# Patient Record
Sex: FEMALE | NOT HISPANIC OR LATINO
[De-identification: names, ages, dates, MRNs, and addresses within clinical notes are randomized per-mention and may not be internally consistent; named-entity substitution may affect disease eponyms.]

---

## 2017-01-10 ENCOUNTER — CHARTING TRANS (OUTPATIENT)
Dept: OTHER | Age: 67
End: 2017-01-10

## 2017-01-12 ENCOUNTER — CHARTING TRANS (OUTPATIENT)
Dept: OTHER | Age: 67
End: 2017-01-12

## 2017-02-06 ENCOUNTER — CHARTING TRANS (OUTPATIENT)
Dept: OTHER | Age: 67
End: 2017-02-06

## 2017-02-09 ENCOUNTER — CHARTING TRANS (OUTPATIENT)
Dept: OTHER | Age: 67
End: 2017-02-09

## 2017-02-28 ENCOUNTER — LAB SERVICES (OUTPATIENT)
Dept: OTHER | Age: 67
End: 2017-02-28

## 2017-02-28 LAB
ALBUMIN SERPL BCG-MCNC: 3.5 G/DL (ref 3.6–5.1)
ALP SERPL-CCNC: 90 U/L (ref 45–105)
ALT SERPL W/O P-5'-P-CCNC: 26 U/L (ref 15–43)
AST SERPL-CCNC: 23 U/L (ref 14–43)
BASOPHIL %: 0.2 % (ref 0–1.2)
BASOPHIL ABSOLUTE #: 0 10*3/UL (ref 0–0.1)
BILIRUB SERPL-MCNC: 0.7 MG/DL (ref 0–1.3)
BUN SERPL-MCNC: 18 MG/DL (ref 7–20)
CALCIUM SERPL-MCNC: 9.5 MG/DL (ref 8.6–10.6)
CHLORIDE SERPL-SCNC: 106 MMOL/L (ref 96–107)
CREATININE, SERUM: 0.8 MG/DL (ref 0.5–1.4)
DIFFERENTIAL TYPE: NORMAL
EOSINOPHIL %: 2.2 % (ref 0–10)
EOSINOPHIL ABSOLUTE #: 0.1 10*3/UL (ref 0–0.5)
GFR SERPL CREATININE-BSD FRML MDRD: >60 ML/MIN/{1.73M2}
GFR SERPL CREATININE-BSD FRML MDRD: >60 ML/MIN/{1.73M2}
GLUCOSE SERPL-MCNC: 91 MG/DL (ref 70–200)
HCO3 SERPL-SCNC: 26 MMOL/L (ref 22–32)
HEMATOCRIT: 39.8 % (ref 34–45)
HEMOGLOBIN: 13.2 G/DL (ref 11.2–15.7)
LYMPH PERCENT: 43.2 % (ref 20.5–51.1)
LYMPHOCYTE ABSOLUTE #: 2 10*3/UL (ref 1.2–3.4)
MEAN CORPUSCULAR HGB CONCENTRATION: 33.2 % (ref 32–36)
MEAN CORPUSCULAR HGB: 29.3 PG (ref 27–34)
MEAN CORPUSCULAR VOLUME: 88.4 FL (ref 79–95)
MEAN PLATELET VOLUME: 9.6 FL (ref 8.6–12.4)
MONOCYTE ABSOLUTE #: 0.6 10*3/UL (ref 0.2–0.9)
MONOCYTE PERCENT: 12.7 % (ref 4.3–12.9)
NEUTROPHIL ABSOLUTE #: 1.9 10*3/UL (ref 1.4–6.5)
NEUTROPHIL PERCENT: 41.7 % (ref 34–73.5)
PLATELET COUNT: 150 10*3/UL (ref 150–400)
POTASSIUM SERPL-SCNC: 4.6 MMOL/L (ref 3.5–5.3)
PROT SERPL-MCNC: 6.4 G/DL (ref 6.4–8.5)
RED BLOOD CELL COUNT: 4.5 10*6/UL (ref 3.7–5.2)
RED CELL DISTRIBUTION WIDTH: 13.3 % (ref 11.3–14.8)
SODIUM SERPL-SCNC: 141 MMOL/L (ref 136–146)
WHITE BLOOD CELL COUNT: 4.6 10*3/UL (ref 4–10)

## 2017-03-09 ENCOUNTER — CHARTING TRANS (OUTPATIENT)
Dept: OTHER | Age: 67
End: 2017-03-09

## 2017-04-19 ENCOUNTER — CHARTING TRANS (OUTPATIENT)
Dept: OTHER | Age: 67
End: 2017-04-19

## 2017-04-21 ENCOUNTER — CHARTING TRANS (OUTPATIENT)
Dept: OTHER | Age: 67
End: 2017-04-21

## 2017-05-05 ENCOUNTER — IMAGING SERVICES (OUTPATIENT)
Dept: OTHER | Age: 67
End: 2017-05-05

## 2017-05-05 ENCOUNTER — CHARTING TRANS (OUTPATIENT)
Dept: OTHER | Age: 67
End: 2017-05-05

## 2017-05-08 ENCOUNTER — CHARTING TRANS (OUTPATIENT)
Dept: OTHER | Age: 67
End: 2017-05-08

## 2017-05-09 ENCOUNTER — CHARTING TRANS (OUTPATIENT)
Dept: OTHER | Age: 67
End: 2017-05-09

## 2017-05-18 ENCOUNTER — CHARTING TRANS (OUTPATIENT)
Dept: OTHER | Age: 67
End: 2017-05-18

## 2017-05-23 ENCOUNTER — CHARTING TRANS (OUTPATIENT)
Dept: OTHER | Age: 67
End: 2017-05-23

## 2017-05-24 ENCOUNTER — CHARTING TRANS (OUTPATIENT)
Dept: OTHER | Age: 67
End: 2017-05-24

## 2017-05-24 ENCOUNTER — MYAURORA ACCOUNT LINK (OUTPATIENT)
Dept: OTHER | Age: 67
End: 2017-05-24

## 2017-05-26 ENCOUNTER — CHARTING TRANS (OUTPATIENT)
Dept: OTHER | Age: 67
End: 2017-05-26

## 2017-06-06 ENCOUNTER — CHARTING TRANS (OUTPATIENT)
Dept: OTHER | Age: 67
End: 2017-06-06

## 2017-06-08 ENCOUNTER — CHARTING TRANS (OUTPATIENT)
Dept: OTHER | Age: 67
End: 2017-06-08

## 2017-06-12 ENCOUNTER — CHARTING TRANS (OUTPATIENT)
Dept: OTHER | Age: 67
End: 2017-06-12

## 2017-06-15 ENCOUNTER — MYAURORA ACCOUNT LINK (OUTPATIENT)
Dept: OTHER | Age: 67
End: 2017-06-15

## 2017-06-15 ENCOUNTER — CHARTING TRANS (OUTPATIENT)
Dept: OTHER | Age: 67
End: 2017-06-15

## 2017-06-30 ENCOUNTER — CHARTING TRANS (OUTPATIENT)
Dept: OTHER | Age: 67
End: 2017-06-30

## 2017-07-25 ENCOUNTER — CHARTING TRANS (OUTPATIENT)
Dept: OTHER | Age: 67
End: 2017-07-25

## 2017-08-11 ENCOUNTER — CHARTING TRANS (OUTPATIENT)
Dept: OTHER | Age: 67
End: 2017-08-11

## 2017-08-11 ENCOUNTER — MYAURORA ACCOUNT LINK (OUTPATIENT)
Dept: OTHER | Age: 67
End: 2017-08-11

## 2017-09-20 ENCOUNTER — CHARTING TRANS (OUTPATIENT)
Dept: OTHER | Age: 67
End: 2017-09-20

## 2017-09-26 ENCOUNTER — CHARTING TRANS (OUTPATIENT)
Dept: OTHER | Age: 67
End: 2017-09-26

## 2017-11-02 ENCOUNTER — CHARTING TRANS (OUTPATIENT)
Dept: OTHER | Age: 67
End: 2017-11-02

## 2018-02-10 ENCOUNTER — CHARTING TRANS (OUTPATIENT)
Dept: OTHER | Age: 68
End: 2018-02-10

## 2018-05-24 ENCOUNTER — CHARTING TRANS (OUTPATIENT)
Dept: OTHER | Age: 68
End: 2018-05-24

## 2018-06-24 ENCOUNTER — CHARTING TRANS (OUTPATIENT)
Dept: OTHER | Age: 68
End: 2018-06-24

## 2018-06-26 ENCOUNTER — MYAURORA ACCOUNT LINK (OUTPATIENT)
Dept: OTHER | Age: 68
End: 2018-06-26

## 2018-11-29 VITALS — HEART RATE: 64 BPM | DIASTOLIC BLOOD PRESSURE: 66 MMHG | SYSTOLIC BLOOD PRESSURE: 118 MMHG | WEIGHT: 135 LBS

## 2019-03-06 VITALS
OXYGEN SATURATION: 97 % | DIASTOLIC BLOOD PRESSURE: 62 MMHG | HEART RATE: 55 BPM | BODY MASS INDEX: 24.55 KG/M2 | SYSTOLIC BLOOD PRESSURE: 120 MMHG | WEIGHT: 143 LBS

## 2020-02-07 ENCOUNTER — OFFICE VISIT (OUTPATIENT)
Dept: FAMILY MEDICINE CLINIC | Facility: CLINIC | Age: 70
End: 2020-02-07
Payer: MEDICARE

## 2020-02-07 VITALS
RESPIRATION RATE: 18 BRPM | TEMPERATURE: 98 F | SYSTOLIC BLOOD PRESSURE: 120 MMHG | WEIGHT: 140.13 LBS | HEART RATE: 80 BPM | DIASTOLIC BLOOD PRESSURE: 66 MMHG | HEIGHT: 61 IN | BODY MASS INDEX: 26.46 KG/M2 | OXYGEN SATURATION: 97 %

## 2020-02-07 DIAGNOSIS — M81.0 AGE-RELATED OSTEOPOROSIS WITHOUT CURRENT PATHOLOGICAL FRACTURE: Primary | ICD-10-CM

## 2020-02-07 DIAGNOSIS — G43.709 CHRONIC MIGRAINE WITHOUT AURA WITHOUT STATUS MIGRAINOSUS, NOT INTRACTABLE: ICD-10-CM

## 2020-02-07 DIAGNOSIS — F41.9 ANXIETY: ICD-10-CM

## 2020-02-07 DIAGNOSIS — R79.89 ELEVATED SERUM CREATININE: ICD-10-CM

## 2020-02-07 DIAGNOSIS — K21.9 GASTROESOPHAGEAL REFLUX DISEASE WITHOUT ESOPHAGITIS: ICD-10-CM

## 2020-02-07 PROCEDURE — 99204 OFFICE O/P NEW MOD 45 MIN: CPT | Performed by: FAMILY MEDICINE

## 2020-02-07 RX ORDER — PYRIDOXINE HCL (VITAMIN B6) 100 MG
TABLET ORAL DAILY
COMMUNITY

## 2020-02-07 RX ORDER — RIZATRIPTAN BENZOATE 10 MG/1
10 TABLET, ORALLY DISINTEGRATING ORAL AS NEEDED
COMMUNITY
End: 2020-04-17

## 2020-02-07 RX ORDER — FLUTICASONE PROPIONATE 50 MCG
2 SPRAY, SUSPENSION (ML) NASAL DAILY
Refills: 0 | COMMUNITY
Start: 2020-02-07 | End: 2020-09-22

## 2020-02-07 RX ORDER — TOPIRAMATE 25 MG/1
25 TABLET ORAL 2 TIMES DAILY
COMMUNITY
Start: 2019-11-19 | End: 2021-03-31

## 2020-02-07 RX ORDER — PANTOPRAZOLE SODIUM 40 MG/1
TABLET, DELAYED RELEASE ORAL
COMMUNITY
Start: 2020-01-16 | End: 2020-02-07 | Stop reason: ALTCHOICE

## 2020-02-07 RX ORDER — MULTIVIT-MIN/IRON/FOLIC ACID/K 18-600-40
2500 CAPSULE ORAL DAILY
COMMUNITY

## 2020-02-07 RX ORDER — BUSPIRONE HYDROCHLORIDE 30 MG/1
30 TABLET ORAL 2 TIMES DAILY
COMMUNITY
End: 2020-10-04

## 2020-02-07 RX ORDER — PYRIDOXINE HCL (VITAMIN B6) 100 MG
22 TABLET ORAL DAILY
COMMUNITY
Start: 2020-02-07 | End: 2021-08-18 | Stop reason: ALTCHOICE

## 2020-02-07 RX ORDER — ALENDRONATE SODIUM 70 MG/1
70 TABLET ORAL WEEKLY
Qty: 12 TABLET | Refills: 3 | Status: SHIPPED | OUTPATIENT
Start: 2020-02-07 | End: 2021-01-09

## 2020-02-07 RX ORDER — PROPRANOLOL HYDROCHLORIDE 10 MG/1
20 TABLET ORAL 2 TIMES DAILY
COMMUNITY
End: 2021-03-31

## 2020-02-07 RX ORDER — MULTIVITAMIN
TABLET ORAL
COMMUNITY

## 2020-02-07 NOTE — PROGRESS NOTES
Derek Shepard is a 68year old female. HPI:   Patient here to establish care (referred by her dtr Geraldo Mata) and discuss bone density. At her last doctor she had a Cpx and a bone density scan and found \"severe osteoporosis. \"  This was her first bone de Oral Tab Take 25 mg by mouth 2 (two) times daily. • Fluticasone Propionate 50 MCG/ACT Nasal Suspension 2 sprays by Each Nare route daily. 0   • Ferrous Fumarate (IRON) 18 MG Oral Tab CR Take 22 mg by mouth daily.      • alendronate 70 MG Oral Tab Take clubbing or edema    ASSESSMENT AND PLAN:   Diagnoses and all orders for this visit:  This visit is primarily for counseling, spent 47 min with pt >50% of time in counseling and review of below:     Age-related osteoporosis without current pathological fra

## 2020-02-11 LAB
BUN/CREATININE RATIO: 25 (CALC) (ref 6–22)
BUN: 27 MG/DL (ref 7–25)
CALCIUM: 9.7 MG/DL (ref 8.6–10.4)
CALCIUM: 9.7 MG/DL (ref 8.6–10.4)
CARBON DIOXIDE: 27 MMOL/L (ref 20–32)
CHLORIDE: 105 MMOL/L (ref 98–110)
CREATININE: 1.06 MG/DL (ref 0.6–0.93)
EGFR IF AFRICN AM: 61 ML/MIN/1.73M2
EGFR IF NONAFRICN AM: 53 ML/MIN/1.73M2
GLUCOSE: 84 MG/DL (ref 65–99)
PARATHYROID HORMONE,$INTACT: 28 PG/ML (ref 14–64)
POTASSIUM: 4.4 MMOL/L (ref 3.5–5.3)
SODIUM: 140 MMOL/L (ref 135–146)

## 2020-02-12 ENCOUNTER — TELEPHONE (OUTPATIENT)
Dept: FAMILY MEDICINE CLINIC | Facility: CLINIC | Age: 70
End: 2020-02-12

## 2020-02-12 NOTE — TELEPHONE ENCOUNTER
The medication list the pt brought to the office visit indicates that the pt had a Prevnar in 2015 but the parenthesis after says shingles    Need to know is she had a prevnar or a shingles vaccine        Left message for the pt to call back so that I can

## 2020-02-14 NOTE — TELEPHONE ENCOUNTER
Spoke with the pt and asked her about the prevnar/shingles on the printout she gave us    She states that she had both the prevnar and shingles vaccine  On 5/8/15

## 2020-04-17 ENCOUNTER — PATIENT MESSAGE (OUTPATIENT)
Dept: FAMILY MEDICINE CLINIC | Facility: CLINIC | Age: 70
End: 2020-04-17

## 2020-04-17 RX ORDER — RIZATRIPTAN BENZOATE 10 MG/1
TABLET ORAL AS NEEDED
Qty: 12 TABLET | Refills: 3 | Status: SHIPPED | OUTPATIENT
Start: 2020-04-17 | End: 2020-09-02

## 2020-04-17 RX ORDER — RIZATRIPTAN BENZOATE 5 MG/1
5 TABLET, ORALLY DISINTEGRATING ORAL EVERY 2 HOUR PRN
Qty: 30 TABLET | Refills: 1 | Status: SHIPPED | OUTPATIENT
Start: 2020-04-17 | End: 2020-04-17

## 2020-04-17 NOTE — TELEPHONE ENCOUNTER
From: Felipe Stern  To: Isabelle Quintanilla MD  Sent: 4/17/2020 8:48 AM CDT  Subject: Prescription Question    Good morning! Could you please put in a refill for my rizatriptan? There is no option to select the refill in the medications section.  Could you set i

## 2020-04-17 NOTE — TELEPHONE ENCOUNTER
From: Andrew Rousseau  To:  Mendez Moya MD  Sent: 2020 9:55 AM CDT  Subject: Prescription Question    So sorry to bother you again but [facility] just called that my prescription for rizatriptan was ready and the price got me to check on it because it was

## 2020-09-02 RX ORDER — RIZATRIPTAN BENZOATE 10 MG/1
TABLET ORAL
Qty: 12 TABLET | Refills: 3 | Status: SHIPPED | OUTPATIENT
Start: 2020-09-02 | End: 2021-01-09

## 2020-09-22 NOTE — TELEPHONE ENCOUNTER
Allergy Medication Protocol [id number]/22/2020 11:02 AM   Appointment in the past 12 or next 3 months     Last refill listed as historical-protocol passed-but never dispensed by [facility]  Last OV 2/7/20   No future appointments.

## 2020-09-23 RX ORDER — FLUTICASONE PROPIONATE 50 MCG
2 SPRAY, SUSPENSION (ML) NASAL DAILY
Qty: 3 INHALER | Refills: 3 | Status: SHIPPED | OUTPATIENT
Start: 2020-09-23 | End: 2021-12-20

## 2020-10-02 ENCOUNTER — PATIENT MESSAGE (OUTPATIENT)
Dept: FAMILY MEDICINE CLINIC | Facility: CLINIC | Age: 70
End: 2020-10-02

## 2020-10-02 NOTE — TELEPHONE ENCOUNTER
From: Peg Chandler  To:  Vandana Kwan MD  Sent: 10/2/2020 10:01 AM CDT  Subject: Prescription Question    Good Morning,  I was trying to refill a prescription for my Buspirone 30 mg 1 tablet a day and there is no option on the my chart to send to you, so

## 2020-10-04 RX ORDER — BUSPIRONE HYDROCHLORIDE 30 MG/1
30 TABLET ORAL DAILY
Qty: 90 TABLET | Refills: 0 | Status: SHIPPED | OUTPATIENT
Start: 2020-10-04 | End: 2021-01-09

## 2021-01-09 NOTE — TELEPHONE ENCOUNTER
Osteoporosis Medication Protocol [id number]/09/2021 08:02 AM   DEXA scan within past 2 years Protocol Details    CMP within the past 12 months     Calcium level between 8.3 and 10.3     GFR level greater than 35     Appointment within past 12 or next 3 month

## 2021-01-10 RX ORDER — ALENDRONATE SODIUM 70 MG/1
70 TABLET ORAL WEEKLY
Qty: 12 TABLET | Refills: 3 | Status: SHIPPED | OUTPATIENT
Start: 2021-01-10 | End: 2021-12-20

## 2021-01-10 RX ORDER — RIZATRIPTAN BENZOATE 10 MG/1
TABLET ORAL AS NEEDED
Qty: 12 TABLET | Refills: 3 | Status: SHIPPED | OUTPATIENT
Start: 2021-01-10 | End: 2021-07-12

## 2021-01-10 RX ORDER — BUSPIRONE HYDROCHLORIDE 30 MG/1
30 TABLET ORAL DAILY
Qty: 90 TABLET | Refills: 0 | Status: SHIPPED | OUTPATIENT
Start: 2021-01-10 | End: 2021-04-20

## 2021-03-15 DIAGNOSIS — Z23 NEED FOR VACCINATION: ICD-10-CM

## 2021-03-31 ENCOUNTER — PATIENT MESSAGE (OUTPATIENT)
Dept: FAMILY MEDICINE CLINIC | Facility: CLINIC | Age: 71
End: 2021-03-31

## 2021-03-31 RX ORDER — TOPIRAMATE 25 MG/1
25 TABLET ORAL 2 TIMES DAILY
Qty: 180 TABLET | Refills: 1 | Status: SHIPPED | OUTPATIENT
Start: 2021-03-31 | End: 2021-04-05

## 2021-03-31 RX ORDER — PROPRANOLOL HYDROCHLORIDE 40 MG/1
40 TABLET ORAL 2 TIMES DAILY
Qty: 180 TABLET | Refills: 1 | Status: SHIPPED | OUTPATIENT
Start: 2021-03-31 | End: 2021-04-05

## 2021-03-31 NOTE — TELEPHONE ENCOUNTER
Topiramate 25mg last refilled on 11/19/2019. Propranolol 10mg last refill 06/26/2018. Last seen on 02/07/2020. Thank you.

## 2021-03-31 NOTE — TELEPHONE ENCOUNTER
From: Jg Garland  To: Francesca Frias MD  Sent: 3/31/2021 3:09 PM CDT  Subject: Prescription Question    Good Afternoon,  I need to get two prescription refills and it says I can not get them through my chart. ..  I'm still using meds from my last Dr. Shayne Sommer r

## 2021-04-06 NOTE — TELEPHONE ENCOUNTER
Following medication failed protocol for the following reasons:    Hypertension Medications Protocol [id number]/05/2021 11:45 PM   CMP or BMP in past 12 months Protocol Details    Appointment in past 6 or next 3 months      Propanolol:  Last refill: 3-

## 2021-04-07 RX ORDER — TOPIRAMATE 25 MG/1
25 TABLET ORAL 2 TIMES DAILY
Qty: 180 TABLET | Refills: 1 | Status: SHIPPED | OUTPATIENT
Start: 2021-04-07 | End: 2021-12-20

## 2021-04-07 RX ORDER — PROPRANOLOL HYDROCHLORIDE 40 MG/1
40 TABLET ORAL 2 TIMES DAILY
Qty: 180 TABLET | Refills: 1 | Status: SHIPPED | OUTPATIENT
Start: 2021-04-07 | End: 2021-12-20

## 2021-04-07 NOTE — TELEPHONE ENCOUNTER
LM for patient that prescriptions were ordered and patient needs to make an OV apt in a month or two.

## 2021-04-20 RX ORDER — BUSPIRONE HYDROCHLORIDE 30 MG/1
TABLET ORAL
Qty: 90 TABLET | Refills: 0 | Status: SHIPPED | OUTPATIENT
Start: 2021-04-20 | End: 2021-07-12

## 2021-04-20 NOTE — TELEPHONE ENCOUNTER
Protocol: none  Last refilled 1/10/21 for #90 with 0 RF  LOV with [facility] 2/7/20  No future appt  Routed to PCP to advise

## 2021-07-12 NOTE — TELEPHONE ENCOUNTER
Last refilled on 04/20/2021 for # 90 with 0 rf. For buspirone hcl 30 mg. Last refilled on 01/10/2021 for # 12 with 3 rf. For rizatriptan 10 mg. Last seen on 02/07/2020. Thank you.

## 2021-07-13 RX ORDER — RIZATRIPTAN BENZOATE 10 MG/1
TABLET ORAL AS NEEDED
Qty: 12 TABLET | Refills: 3 | Status: SHIPPED | OUTPATIENT
Start: 2021-07-13 | End: 2021-12-20

## 2021-07-13 RX ORDER — BUSPIRONE HYDROCHLORIDE 30 MG/1
30 TABLET ORAL DAILY
Qty: 90 TABLET | Refills: 0 | Status: SHIPPED | OUTPATIENT
Start: 2021-07-13 | End: 2021-10-19

## 2021-08-18 ENCOUNTER — OFFICE VISIT (OUTPATIENT)
Dept: FAMILY MEDICINE CLINIC | Facility: CLINIC | Age: 71
End: 2021-08-18
Payer: MEDICARE

## 2021-08-18 ENCOUNTER — HOSPITAL ENCOUNTER (OUTPATIENT)
Dept: GENERAL RADIOLOGY | Age: 71
Discharge: HOME | End: 2021-08-18
Attending: FAMILY MEDICINE
Payer: MEDICARE

## 2021-08-18 VITALS
HEART RATE: 57 BPM | BODY MASS INDEX: 26.62 KG/M2 | OXYGEN SATURATION: 99 % | HEIGHT: 61.5 IN | TEMPERATURE: 99 F | WEIGHT: 142.81 LBS | DIASTOLIC BLOOD PRESSURE: 70 MMHG | SYSTOLIC BLOOD PRESSURE: 120 MMHG

## 2021-08-18 DIAGNOSIS — M79.645 FINGER PAIN, LEFT: ICD-10-CM

## 2021-08-18 DIAGNOSIS — H92.02 LEFT EAR PAIN: ICD-10-CM

## 2021-08-18 DIAGNOSIS — R20.9 COLD EXTREMITIES: ICD-10-CM

## 2021-08-18 DIAGNOSIS — M81.0 AGE-RELATED OSTEOPOROSIS WITHOUT CURRENT PATHOLOGICAL FRACTURE: Primary | ICD-10-CM

## 2021-08-18 DIAGNOSIS — R09.89 DIMINISHED PULSES IN LOWER EXTREMITY: ICD-10-CM

## 2021-08-18 DIAGNOSIS — F41.9 ANXIETY: ICD-10-CM

## 2021-08-18 DIAGNOSIS — M25.551 RIGHT HIP PAIN: ICD-10-CM

## 2021-08-18 DIAGNOSIS — G43.709 CHRONIC MIGRAINE WITHOUT AURA WITHOUT STATUS MIGRAINOSUS, NOT INTRACTABLE: ICD-10-CM

## 2021-08-18 DIAGNOSIS — K21.9 GASTROESOPHAGEAL REFLUX DISEASE WITHOUT ESOPHAGITIS: ICD-10-CM

## 2021-08-18 DIAGNOSIS — H61.22 IMPACTED CERUMEN OF LEFT EAR: ICD-10-CM

## 2021-08-18 PROCEDURE — 73502 X-RAY EXAM HIP UNI 2-3 VIEWS: CPT | Performed by: FAMILY MEDICINE

## 2021-08-18 PROCEDURE — 99215 OFFICE O/P EST HI 40 MIN: CPT | Performed by: FAMILY MEDICINE

## 2021-08-18 RX ORDER — FAMOTIDINE 20 MG/1
20 TABLET ORAL 2 TIMES DAILY
COMMUNITY

## 2021-08-18 NOTE — PROGRESS NOTES
Severa Barge is a 66year old female. HPI:   Patient wanted to touch base on everything prior to my leaving the practice. 1) Wonders if she should take pantoprazole or famotidine.   The famotidine seems to be working great and she thinks I told her to daily.     • Probiotic Product (PROBIOTIC-10 OR) Take by mouth daily. • Cholecalciferol (VITAMIN D) 50 MCG (2000 UT) Oral Cap Take 2,500 Units by mouth daily.            HISTORY:  Past Medical History:   Diagnosis Date   • Anxiety    • Esophageal reflux PLAN:   Diagnoses and all orders for this visit:    Age-related osteoporosis without current pathological fracture  -due for dexa  -cont fosamax in meantime  -reviewed lifestyle habits that are helpful  -d/w patient PPI may increase osteoporosis; fine to u

## 2021-09-02 ENCOUNTER — PATIENT MESSAGE (OUTPATIENT)
Dept: FAMILY MEDICINE CLINIC | Facility: CLINIC | Age: 71
End: 2021-09-02

## 2021-09-02 DIAGNOSIS — M79.645 PAIN OF FINGER OF LEFT HAND: Primary | ICD-10-CM

## 2021-09-02 NOTE — TELEPHONE ENCOUNTER
From: Derek Shepard  To: Johan Gilmore MD  Sent: 9/2/2021 9:44 AM CDT  Subject: Visit Follow-up Question    Good Morning,  I wanted to follow up on the problem I talked to you about with my left hand, the pointer finger knuckle area.  I have tried using the

## 2021-09-21 ENCOUNTER — TELEPHONE (OUTPATIENT)
Dept: FAMILY MEDICINE CLINIC | Facility: CLINIC | Age: 71
End: 2021-09-21

## 2021-10-06 ENCOUNTER — HOSPITAL ENCOUNTER (OUTPATIENT)
Dept: GENERAL RADIOLOGY | Age: 71
Discharge: HOME | End: 2021-10-06
Attending: FAMILY MEDICINE
Payer: MEDICARE

## 2021-10-06 DIAGNOSIS — M79.645 PAIN OF FINGER OF LEFT HAND: ICD-10-CM

## 2021-10-06 PROCEDURE — 73140 X-RAY EXAM OF FINGER(S): CPT | Performed by: FAMILY MEDICINE

## 2021-10-19 RX ORDER — BUSPIRONE HYDROCHLORIDE 30 MG/1
30 TABLET ORAL DAILY
Qty: 90 TABLET | Refills: 0 | Status: SHIPPED | OUTPATIENT
Start: 2021-10-19 | End: 2022-01-25

## 2021-10-25 ENCOUNTER — HOSPITAL ENCOUNTER (OUTPATIENT)
Dept: ULTRASOUND IMAGING | Facility: HOSPITAL | Age: 71
Discharge: HOME | End: 2021-10-25
Attending: FAMILY MEDICINE
Payer: MEDICARE

## 2021-10-25 DIAGNOSIS — R09.89 DIMINISHED PULSES IN LOWER EXTREMITY: ICD-10-CM

## 2021-10-25 DIAGNOSIS — R20.9 COLD EXTREMITIES: ICD-10-CM

## 2021-10-25 PROCEDURE — 93922 UPR/L XTREMITY ART 2 LEVELS: CPT | Performed by: FAMILY MEDICINE

## 2021-10-28 ENCOUNTER — TELEPHONE (OUTPATIENT)
Dept: FAMILY MEDICINE CLINIC | Facility: CLINIC | Age: 71
End: 2021-10-28

## 2021-10-28 NOTE — TELEPHONE ENCOUNTER
Last OV 8/18/21 Martín Miner)  Last refilled 10/19/21  Form faxed back to pharmacy requesting they fill script on file

## 2021-12-20 RX ORDER — RIZATRIPTAN BENZOATE 10 MG/1
TABLET ORAL AS NEEDED
Qty: 12 TABLET | Refills: 3 | Status: SHIPPED | OUTPATIENT
Start: 2021-12-20

## 2021-12-20 RX ORDER — ALENDRONATE SODIUM 70 MG/1
70 TABLET ORAL WEEKLY
Qty: 12 TABLET | Refills: 3 | Status: SHIPPED | OUTPATIENT
Start: 2021-12-20

## 2021-12-20 RX ORDER — TOPIRAMATE 25 MG/1
25 TABLET ORAL 2 TIMES DAILY
Qty: 180 TABLET | Refills: 1 | Status: SHIPPED | OUTPATIENT
Start: 2021-12-20

## 2021-12-20 RX ORDER — FLUTICASONE PROPIONATE 50 MCG
2 SPRAY, SUSPENSION (ML) NASAL DAILY
Qty: 3 EACH | Refills: 0 | Status: SHIPPED | OUTPATIENT
Start: 2021-12-20

## 2021-12-20 RX ORDER — PROPRANOLOL HYDROCHLORIDE 40 MG/1
40 TABLET ORAL 2 TIMES DAILY
Qty: 180 TABLET | Refills: 1 | Status: SHIPPED | OUTPATIENT
Start: 2021-12-20

## 2021-12-20 NOTE — TELEPHONE ENCOUNTER
PT CALLED AND ADV NEEDS REFILLS OF ALL HER MEDS    Rizatriptan Benzoate 10 MG Oral Tab    AND    Propranolol HCl 40 MG Oral Tab    AND    topiramate 25 MG Oral Tab    AND    alendronate 70 MG Oral Tab    AND    Fluticasone Propionate 50 MCG/ACT Nasal Suspe

## 2021-12-20 NOTE — TELEPHONE ENCOUNTER
.No refill protocol for these medications or protocol failed.     Rizatriptan:  Last refill: 7- with 3 refills    Propanolol:   Last refill: 4-07-21 #180 with 1 refill    Topiramate:  Last refill: 4-07-21 #180 with 1 refill    Alendronate:   Last ref

## 2022-01-25 RX ORDER — BUSPIRONE HYDROCHLORIDE 30 MG/1
30 TABLET ORAL DAILY
Qty: 90 TABLET | Refills: 0 | Status: SHIPPED | OUTPATIENT
Start: 2022-01-25

## 2022-01-25 NOTE — TELEPHONE ENCOUNTER
Last refilled 10/19/21 for #90 with 0 RF  LOV with [facility] 8/18/21  No future appt  Protocol: none

## 2022-03-11 ENCOUNTER — TELEPHONE (OUTPATIENT)
Dept: FAMILY MEDICINE CLINIC | Facility: CLINIC | Age: 72
End: 2022-03-11

## 2022-03-23 ENCOUNTER — OFFICE VISIT (OUTPATIENT)
Dept: FAMILY MEDICINE CLINIC | Facility: CLINIC | Age: 72
End: 2022-03-23
Payer: MEDICARE

## 2022-03-23 ENCOUNTER — TELEPHONE (OUTPATIENT)
Dept: FAMILY MEDICINE CLINIC | Facility: CLINIC | Age: 72
End: 2022-03-23

## 2022-03-23 VITALS
HEIGHT: 61.5 IN | TEMPERATURE: 98 F | WEIGHT: 145 LBS | HEART RATE: 75 BPM | OXYGEN SATURATION: 98 % | RESPIRATION RATE: 16 BRPM | DIASTOLIC BLOOD PRESSURE: 70 MMHG | BODY MASS INDEX: 27.03 KG/M2 | SYSTOLIC BLOOD PRESSURE: 118 MMHG

## 2022-03-23 DIAGNOSIS — H60.332 ACUTE SWIMMER'S EAR OF LEFT SIDE: ICD-10-CM

## 2022-03-23 DIAGNOSIS — H61.22 IMPACTED CERUMEN OF LEFT EAR: Primary | ICD-10-CM

## 2022-03-23 DIAGNOSIS — H91.8X2 OTHER SPECIFIED HEARING LOSS OF LEFT EAR, UNSPECIFIED HEARING STATUS ON CONTRALATERAL SIDE: ICD-10-CM

## 2022-03-23 PROCEDURE — 99213 OFFICE O/P EST LOW 20 MIN: CPT | Performed by: FAMILY MEDICINE

## 2022-03-23 PROCEDURE — 69210 REMOVE IMPACTED EAR WAX UNI: CPT | Performed by: FAMILY MEDICINE

## 2022-03-23 RX ORDER — CIPROFLOXACIN HYDROCHLORIDE 3.5 MG/ML
SOLUTION/ DROPS TOPICAL
Qty: 10 ML | Refills: 0 | Status: SHIPPED | OUTPATIENT
Start: 2022-03-23 | End: 2022-03-29

## 2022-03-23 RX ORDER — CIPROFLOXACIN 0.5 MG/.25ML
0.2 SOLUTION/ DROPS AURICULAR (OTIC) 2 TIMES DAILY
Qty: 1 EACH | Refills: 0 | Status: SHIPPED | OUTPATIENT
Start: 2022-03-23 | End: 2022-03-23

## 2022-03-23 RX ORDER — NEOMYCIN SULFATE, POLYMYXIN B SULFATE AND HYDROCORTISONE 10; 3.5; 1 MG/ML; MG/ML; [USP'U]/ML
3 SUSPENSION/ DROPS AURICULAR (OTIC) 3 TIMES DAILY
Qty: 10 ML | Refills: 0 | Status: SHIPPED | OUTPATIENT
Start: 2022-03-23 | End: 2022-03-23

## 2022-03-23 NOTE — TELEPHONE ENCOUNTER
Cortipsorin ear drops - preferred by her insurance, however she has an allergy to a component (preservative) in it. I have asked the pharmacy to possibly dispense something that does not have this thiomerosal, however I am unsure if there are able. If not, for now she can just wait and watch, and blow dry her ears to avoid excessive wetness / moisture within the ear canals. Or possibly try using [facility] for the prior Rx.

## 2022-03-23 NOTE — TELEPHONE ENCOUNTER
Pt tried to get ear drops at [facility] and was told prescription was $90.  Pharmacy advised that a different prescription for something similar would be cheaper. Can a new prescription be sent for:    Ciprofloxacin HCl 0.2 % Otic Solution     Pharmacy:    [address] , [address] [facility] [phone], [facility] [id number]   Phone: [phone] Fax: [phone]         Please advise. Thank you!

## 2022-03-23 NOTE — TELEPHONE ENCOUNTER
Patient called and stated she spoke to [facility] pharmacy. She says they are nicer there. The [facility] Pharmacist told her that [address] Drops can be used in ears and contains no thiomerosal. I advised patient to blow dry her ears to avoid any moisture, per Dr Patino Re notes,  but insisted we call [facility] for Cipro Eyedrops.      [facility] in [place] (inside [employer])   [phone]

## 2022-04-12 ENCOUNTER — OFFICE VISIT (OUTPATIENT)
Dept: FAMILY MEDICINE CLINIC | Facility: CLINIC | Age: 72
End: 2022-04-12
Payer: MEDICARE

## 2022-04-12 VITALS
RESPIRATION RATE: 16 BRPM | OXYGEN SATURATION: 96 % | BODY MASS INDEX: 26.65 KG/M2 | SYSTOLIC BLOOD PRESSURE: 112 MMHG | TEMPERATURE: 97 F | HEIGHT: 61.5 IN | HEART RATE: 63 BPM | WEIGHT: 143 LBS | DIASTOLIC BLOOD PRESSURE: 72 MMHG

## 2022-04-12 DIAGNOSIS — M62.838 TRAPEZIUS MUSCLE SPASM: ICD-10-CM

## 2022-04-12 DIAGNOSIS — Z51.81 MEDICATION MONITORING ENCOUNTER: ICD-10-CM

## 2022-04-12 DIAGNOSIS — M81.0 AGE-RELATED OSTEOPOROSIS WITHOUT CURRENT PATHOLOGICAL FRACTURE: ICD-10-CM

## 2022-04-12 DIAGNOSIS — K21.9 GASTROESOPHAGEAL REFLUX DISEASE WITHOUT ESOPHAGITIS: Primary | ICD-10-CM

## 2022-04-12 PROCEDURE — 99213 OFFICE O/P EST LOW 20 MIN: CPT | Performed by: FAMILY MEDICINE

## 2022-04-12 RX ORDER — CYCLOBENZAPRINE HCL 5 MG
5 TABLET ORAL NIGHTLY
Qty: 7 TABLET | Refills: 0 | Status: SHIPPED | OUTPATIENT
Start: 2022-04-12 | End: 2022-04-19

## 2022-05-09 RX ORDER — BUSPIRONE HYDROCHLORIDE 30 MG/1
TABLET ORAL
Qty: 90 TABLET | Refills: 0 | Status: SHIPPED | OUTPATIENT
Start: 2022-05-09

## 2022-05-09 NOTE — TELEPHONE ENCOUNTER
Last refilled on 1/25/22 for # 90 with 0 refills  Last OV 4/12/22  Future Appointments   Date Time Provider Sejal Mohr   8/17/2022  2:00 PM Erasmo Lomeli MD [facility] DEDRA Zahng        Thank you.

## 2022-06-16 ENCOUNTER — TELEPHONE (OUTPATIENT)
Dept: FAMILY MEDICINE CLINIC | Facility: CLINIC | Age: 72
End: 2022-06-16

## 2022-06-16 ENCOUNTER — HOSPITAL ENCOUNTER (OUTPATIENT)
Dept: GENERAL RADIOLOGY | Age: 72
Discharge: HOME | End: 2022-06-16
Attending: FAMILY MEDICINE
Payer: MEDICARE

## 2022-06-16 ENCOUNTER — OFFICE VISIT (OUTPATIENT)
Dept: FAMILY MEDICINE CLINIC | Facility: CLINIC | Age: 72
End: 2022-06-16
Payer: MEDICARE

## 2022-06-16 VITALS
TEMPERATURE: 97 F | WEIGHT: 142 LBS | RESPIRATION RATE: 16 BRPM | DIASTOLIC BLOOD PRESSURE: 78 MMHG | HEIGHT: 61.5 IN | HEART RATE: 70 BPM | OXYGEN SATURATION: 100 % | SYSTOLIC BLOOD PRESSURE: 120 MMHG | BODY MASS INDEX: 26.47 KG/M2

## 2022-06-16 DIAGNOSIS — M85.88 OSTEOPENIA OF SPINE: Primary | ICD-10-CM

## 2022-06-16 DIAGNOSIS — M81.0 AGE-RELATED OSTEOPOROSIS WITHOUT CURRENT PATHOLOGICAL FRACTURE: ICD-10-CM

## 2022-06-16 DIAGNOSIS — K21.9 GASTROESOPHAGEAL REFLUX DISEASE, UNSPECIFIED WHETHER ESOPHAGITIS PRESENT: ICD-10-CM

## 2022-06-16 DIAGNOSIS — M54.2 NECK PAIN: Primary | ICD-10-CM

## 2022-06-16 DIAGNOSIS — F41.9 ANXIETY: ICD-10-CM

## 2022-06-16 DIAGNOSIS — M99.79 NARROWING OF INTERVERTEBRAL DISC SPACE: ICD-10-CM

## 2022-06-16 DIAGNOSIS — M54.2 NECK PAIN: ICD-10-CM

## 2022-06-16 PROCEDURE — 72040 X-RAY EXAM NECK SPINE 2-3 VW: CPT | Performed by: FAMILY MEDICINE

## 2022-06-16 PROCEDURE — 99214 OFFICE O/P EST MOD 30 MIN: CPT | Performed by: FAMILY MEDICINE

## 2022-06-16 RX ORDER — SODIUM FLUORIDE 6.1 MG/ML
1 GEL, DENTIFRICE DENTAL AS DIRECTED
COMMUNITY
Start: 2022-04-21

## 2022-06-16 NOTE — TELEPHONE ENCOUNTER
Please inform her xray neck shows osteopenia and some arthritis changes with disc narrowing. I would recommend taking ibuprofen 200mg 3 times a day for 5 days and also heating pad and massage. We can try some PT if she is willing. If symptomg no better with pain med and PT then consider mri. She might need PT for approval for MRI.

## 2022-06-22 ENCOUNTER — TELEPHONE (OUTPATIENT)
Dept: FAMILY MEDICINE CLINIC | Facility: CLINIC | Age: 72
End: 2022-06-22

## 2022-07-15 NOTE — TELEPHONE ENCOUNTER
Rizatriptan Benzoate 10 MG Oral Tab  propranolol 40 MG Oral Tab  topiramate 25 MG Oral Tab  Send to [facility]    Pt last OV 6/16/22,  States she talked to Dr Brannon Caceres about getting these RX refilled.

## 2022-07-16 RX ORDER — PROPRANOLOL HYDROCHLORIDE 40 MG/1
40 TABLET ORAL 2 TIMES DAILY
Qty: 180 TABLET | Refills: 1 | Status: SHIPPED | OUTPATIENT
Start: 2022-07-16

## 2022-07-16 RX ORDER — RIZATRIPTAN BENZOATE 10 MG/1
TABLET ORAL AS NEEDED
Qty: 12 TABLET | Refills: 3 | Status: SHIPPED | OUTPATIENT
Start: 2022-07-16

## 2022-07-16 RX ORDER — TOPIRAMATE 25 MG/1
25 TABLET ORAL 2 TIMES DAILY
Qty: 180 TABLET | Refills: 1 | Status: SHIPPED | OUTPATIENT
Start: 2022-07-16

## 2022-07-19 ENCOUNTER — TELEPHONE (OUTPATIENT)
Dept: PHYSICAL THERAPY | Age: 72
End: 2022-07-19

## 2022-07-19 ENCOUNTER — TELEPHONE (OUTPATIENT)
Dept: PHYSICAL THERAPY | Facility: HOSPITAL | Age: 72
End: 2022-07-19

## 2022-07-22 ENCOUNTER — OFFICE VISIT (OUTPATIENT)
Dept: PHYSICAL THERAPY | Age: 72
End: 2022-07-22
Attending: FAMILY MEDICINE
Payer: MEDICARE

## 2022-07-22 PROCEDURE — 97161 PT EVAL LOW COMPLEX 20 MIN: CPT

## 2022-07-22 PROCEDURE — 97110 THERAPEUTIC EXERCISES: CPT

## 2022-07-26 ENCOUNTER — OFFICE VISIT (OUTPATIENT)
Dept: PHYSICAL THERAPY | Age: 72
End: 2022-07-26
Attending: FAMILY MEDICINE
Payer: MEDICARE

## 2022-07-26 PROCEDURE — 97112 NEUROMUSCULAR REEDUCATION: CPT

## 2022-07-26 PROCEDURE — 97110 THERAPEUTIC EXERCISES: CPT

## 2022-07-26 NOTE — PROGRESS NOTES
Insurance (Authorized # of Visits):  Medicare/AARP, 16 visits via plan of care. Diagnosis: Osteopenia of spine (M85.88)  Narrowing of intervertebral disc space (M99.79)        Referring Provider: Lara Pierce   Date of Evaluation:    7/22/2022  Precautions:  None  Next MD visit:   none scheduled  Date of Surgery: n/a               Subjective: Pain at its worst 10/10     Did home program about 25% of what therapist asked for. Worse, more frequent intense pain. Thinks home program gave her a migraine (however, displays incorrect form of home program, see below)  Objective:   Tested 7/26/2022:  (Pain with *)    Cervical AROM: (* denotes performed with pain)  Extension: 159*  Sidebending: R 55; L 25*  Rotation: R 67*; L 77*  Retraction: mod/severe*    Nerve tension tests:   Radial nerve: 105* R (elbow), L 105*  Ulnar: 142* R (elbow), L intact  Median: 175* R (elbow), L 170*    Palpation: warm to palpation in neck     Tested 7/22/2022:  Flexibility:   UE/Scapular   Upper Trap: R min loss; L min loss  Levator Scap: R min loss; L min loss  Pec Major: R intact; L intact  Scalenes: R min loss, L min loss        Strength:  Rhomboids: R 3+/5, L 3-/5  Mid trap: R 3-/5; L 3-/5  Lats: R 4-/5, L 3+/5  Low trap: R 3-/5; L 3-/5         Assessment: Given trial of L lateral flexion sustained      Goals: (to be met in 16 visits)   Pt will improve FOTO score from 68/100 to 88/100 to display improved ability to drive  Pt will reduce pain at its worst from 10/10 to 7/10 to allow for improved ability to sleep  Pt will rotation ROM on R from 60 degrees to 85 degrees to allow for improved ability to turn head to do housework  Pt will be independent with home program to allow for maintenance of goals achieved in therapy. Plan:Exhasut L lateral flexion stretching     Charges: 2 there ex, 1 neuro re-education     Total Timed Treatment: 40 min  Total Treatment Time: 40 min  Date: 7/26/2022  Tx#: 2 Date: Tx#: 3 Date:   Tx#: 4 Date:  Tx#: 5 Date: Tx#: 6   Ther-Ex  Retraction supine x2  (incorrect form, done with flexion bias)    L lateral flexion x10, 30', 3 minutes (2 sets)    Motivational interviewing to increase adherence    Educated on home program, see below. Verbal, visual and tactile cues for there ex. Manual         N Re-Ed  Educated on ideal sitting posture, practiced in clinic told to ice neck 3x, 20 minutes a day. Discussed plan of care.

## 2022-07-27 ENCOUNTER — OFFICE VISIT (OUTPATIENT)
Dept: PHYSICAL THERAPY | Age: 72
End: 2022-07-27
Attending: FAMILY MEDICINE
Payer: MEDICARE

## 2022-07-27 PROCEDURE — 97110 THERAPEUTIC EXERCISES: CPT

## 2022-07-27 NOTE — PROGRESS NOTES
Insurance (Authorized # of Visits):  Medicare/AARP, 16 visits via plan of care. Diagnosis: Osteopenia of spine (M85.88)  Narrowing of intervertebral disc space (M99.79)        Referring Provider: Marcelina Smith   Date of Evaluation:    7/22/2022  Precautions:  None  Next MD visit:   none scheduled  Date of Surgery: n/a               Subjective: Pain at its worst 6/10     Did home program.  Better, less frequent pain, more ROM with turning her head to drive  Objective:   Tested 7/27/2022:  (Pain with *)    Cervical AROM: (* denotes performed with pain)  Extension: 169*  Sidebending: R 53; L 45*  Rotation: R 75*; L 93*  Retraction: mod*    Nerve tension tests:   Radial nerve: appears intact bilaterally  Ulnar: R end range pain, L intact  Median: 175* R (elbow), L 173*    Tested 7/22/2022:  Palpation: warm to palpation in neck     Flexibility:   UE/Scapular   Upper Trap: R min loss; L min loss  Levator Scap: R min loss; L min loss  Pec Major: R intact; L intact  Scalenes: R min loss, L min loss        Strength:  Rhomboids: R 3+/5, L 3-/5  Mid trap: R 3-/5; L 3-/5  Lats: R 4-/5, L 3+/5  Low trap: R 3-/5; L 3-/5         Assessment: Improving with L lateral flexion, able to progress exercise with weight now. Goals: (to be met in 16 visits)   Pt will improve FOTO score from 68/100 to 88/100 to display improved ability to drive  Pt will reduce pain at its worst from 10/10 to 7/10 to allow for improved ability to sleep  Pt will rotation ROM on R from 60 degrees to 85 degrees to allow for improved ability to turn head to do housework  Pt will be independent with home program to allow for maintenance of goals achieved in therapy. Plan:Exhasut L lateral flexion stretching     Charges: 1 there ex,    Total Timed Treatment: 20 min  Total Treatment Time: 40 min (Session with another patient, only half of this billed per Medicare rules)  Date: 7/26/2022  Tx#: 2 Date: 7/27/2022  Tx#: 3 Date: Tx#: 4 Date:   Tx#: 5 Date:  Tx#: 6   Ther-Ex  Retraction supine x2  (incorrect form, done with flexion bias)    L lateral flexion x10, 30', 3 minutes (2 sets)    Motivational interviewing to increase adherence    Educated on home program, see below. Verbal, visual and tactile cues for there ex. There ex:  Bugger 30': after worse ROM    L lateral flexion  3 minutes (4 sets): better (more better with retraction bias)    L lateral flexion mobilization 3 minutes in sitting    2lbs shoulder strength 20 reps, 1-2 sets):   -Shoulder extension  -Shoulder external rotation  -Shoulder abduction mid-range  -Bicep curls    Posture education, told avoid siting in bed    Educated on home program, see below. Verbal, visual and tactile cues for there ex. Manual         N Re-Ed  Educated on ideal sitting posture, practiced in clinic told to ice neck 3x, 20 minutes a day. Discussed plan of care.

## 2022-08-03 ENCOUNTER — TELEPHONE (OUTPATIENT)
Dept: FAMILY MEDICINE CLINIC | Facility: CLINIC | Age: 72
End: 2022-08-03

## 2022-08-03 RX ORDER — BUSPIRONE HYDROCHLORIDE 30 MG/1
30 TABLET ORAL DAILY
Qty: 90 TABLET | Refills: 0 | Status: SHIPPED | OUTPATIENT
Start: 2022-08-03 | End: 2022-11-09

## 2022-08-03 NOTE — TELEPHONE ENCOUNTER
Pt stated she already had the bone density scan at [facility]. Pt stated she receives notices to have it done. Pt would like the request removed from her chart. Thank you!

## 2022-08-04 ENCOUNTER — MED REC SCAN ONLY (OUTPATIENT)
Dept: FAMILY MEDICINE CLINIC | Facility: CLINIC | Age: 72
End: 2022-08-04

## 2022-08-10 ENCOUNTER — APPOINTMENT (OUTPATIENT)
Dept: PHYSICAL THERAPY | Age: 72
End: 2022-08-10
Attending: FAMILY MEDICINE
Payer: MEDICARE

## 2022-08-10 ENCOUNTER — OFFICE VISIT (OUTPATIENT)
Dept: PHYSICAL THERAPY | Age: 72
End: 2022-08-10
Attending: FAMILY MEDICINE
Payer: MEDICARE

## 2022-08-10 PROCEDURE — 97110 THERAPEUTIC EXERCISES: CPT

## 2022-08-10 NOTE — PROGRESS NOTES
Insurance (Authorized # of Visits):  Medicare/AARP, 16 visits via plan of care. Diagnosis: Osteopenia of spine (M85.88)  Narrowing of intervertebral disc space (M99.79)        Referring Provider: Tracee Gimenez   Date of Evaluation:    7/22/2022  Precautions:  None  Next MD visit:   none scheduled  Date of Surgery: n/a               Subjective: Pain at its worst 6/10. About the same. Still lots of pain with moving her head and intense headaches. Did home program about 50% of what therapist asked for. Objective:   Tested 8/10/2022:  (Pain with *)    Cervical AROM: (* denotes performed with pain)  Extension: 167*  Sidebending: R 43; L 26*  Rotation: R 76*; L 80*  Retraction: mod*    Nerve tension tests:   Radial nerve: appears intact bilaterally  Ulnar: R end range pain, L intact  Median: 167* R (elbow), L 171*      Tested 7/22/2022:  Palpation: warm to palpation in neck     Flexibility:   UE/Scapular   Upper Trap: R min loss; L min loss  Levator Scap: R min loss; L min loss  Pec Major: R intact; L intact  Scalenes: R min loss, L min loss        Strength:  Rhomboids: R 3+/5, L 3-/5  Mid trap: R 3-/5; L 3-/5  Lats: R 4-/5, L 3+/5  Low trap: R 3-/5; L 3-/5         Assessment: Given trial of mid-cervical retraction with towel overpressure. Goals: (to be met in 16 visits)   Pt will improve FOTO score from 68/100 to 88/100 to display improved ability to drive  Pt will reduce pain at its worst from 10/10 to 7/10 to allow for improved ability to sleep  Pt will rotation ROM on R from 60 degrees to 85 degrees to allow for improved ability to turn head to do housework  Pt will be independent with home program to allow for maintenance of goals achieved in therapy. Plan:Exhasut mid-cervical retraction with towel overpressure. If no better see pure extension stretching.     Charges: 3 there ex,    Total Timed Treatment: 40 min  Total Treatment Time: 40 min   Date: 7/26/2022  Tx#: 2 Date: 7/27/2022  Tx#: 3 Date: 8/10/2022  Tx#: 4 Date: Tx#: 5 Date: Tx#: 6   Ther-Ex  Retraction supine x2  (incorrect form, done with flexion bias)    L lateral flexion x10, 30', 3 minutes (2 sets)    Motivational interviewing to increase adherence    Educated on home program, see below. Verbal, visual and tactile cues for there ex. There ex:  Bugger 30': after worse ROM    L lateral flexion  3 minutes (4 sets): better (more better with retraction bias)    L lateral flexion mobilization 3 minutes in sitting    2lbs shoulder strength 20 reps, 1-2 sets):   -Shoulder extension  -Shoulder external rotation  -Shoulder abduction mid-range  -Bicep curls    Posture education, told avoid siting in bed    Educated on home program, see below. Verbal, visual and tactile cues for there ex. There ex:   Retraction x10: after ROM a bit worse    L lateral flexion 3 minutes sustained: after worse ROM    L rotation sitting x10: after worse ROM    R rotation sitting x10: after worse ROM    Mid-Cervical retraction with pillow cases around neck at level of ear lobes x10 (2 sets) 30' and 3 minutes: better  (2 more sets done 1 minute each)    2-2lbs shoulder strength 20 reps, 1 set):   -Shoulder extension  -Shoulder external rotation  -Shoulder abduction mid-range  -Bicep curls    Educated on home program, see below. Verbal, visual and tactile cues for there ex. Manual         N Re-Ed  Educated on ideal sitting posture, practiced in clinic told to ice neck 3x, 20 minutes a day. Discussed plan of care.

## 2022-08-12 ENCOUNTER — OFFICE VISIT (OUTPATIENT)
Dept: PHYSICAL THERAPY | Age: 72
End: 2022-08-12
Attending: FAMILY MEDICINE
Payer: MEDICARE

## 2022-08-12 PROCEDURE — 97110 THERAPEUTIC EXERCISES: CPT

## 2022-08-12 NOTE — PROGRESS NOTES
Insurance (Authorized # of Visits):  Medicare/AARP, 16 visits via plan of care. Diagnosis: Osteopenia of spine (M85.88)  Narrowing of intervertebral disc space (M99.79)        Referring Provider: Beto Suazo   Date of Evaluation:    7/22/2022  Precautions:  None  Next MD visit:   none scheduled  Date of Surgery: n/a               Subjective: Pain at its worst 3-4/10. Much better. New exercise helps with headaches. Did home program about 70% of what therapist asked for. Objective:   Tested 8/12/2022:  (Pain with *)    Cervical AROM: (* denotes performed with pain)  Extension: 170  Sidebending: R 43; L 26  Rotation: R 76; L 79  Retraction: mod/min    Nerve tension tests:   Radial nerve: appears intact bilaterally  Ulnar: R end range pain, L intact  Median: R end range pain, L appears intact      Tested 7/22/2022:  Palpation: warm to palpation in neck     Flexibility:   UE/Scapular   Upper Trap: R min loss; L min loss  Levator Scap: R min loss; L min loss  Pec Major: R intact; L intact  Scalenes: R min loss, L min loss        Strength:  Rhomboids: R 3+/5, L 3-/5  Mid trap: R 3-/5; L 3-/5  Lats: R 4-/5, L 3+/5  Low trap: R 3-/5; L 3-/5         Assessment: Improving with mid-cervical retraction with towel overpressure. Progressed further with therapist forces and more advanced strengthening exercises. Goals: (to be met in 16 visits)   Pt will improve FOTO score from 68/100 to 88/100 to display improved ability to drive  Pt will reduce pain at its worst from 10/10 to 7/10 to allow for improved ability to sleep  Pt will rotation ROM on R from 60 degrees to 85 degrees to allow for improved ability to turn head to do housework  Pt will be independent with home program to allow for maintenance of goals achieved in therapy. Plan:  Tell her do HEP with back against wall (no need for pillows)    Progress strength    Exhasut mid-cervical retraction with towel overpressure.     If no better see pure extension stretching. Charges: 3 there ex,    Total Timed Treatment: 40 min  Total Treatment Time: 40 min   Date: 7/26/2022  Tx#: 2 Date: 7/27/2022  Tx#: 3 Date: 8/10/2022  Tx#: 4 Date: 8/12/2022  Tx#: 5 Date: Tx#: 6   Ther-Ex  Retraction supine x2  (incorrect form, done with flexion bias)    L lateral flexion x10, 30', 3 minutes (2 sets)    Motivational interviewing to increase adherence    Educated on home program, see below. Verbal, visual and tactile cues for there ex. There ex:  Bugger 30': after worse ROM    L lateral flexion  3 minutes (4 sets): better (more better with retraction bias)    L lateral flexion mobilization 3 minutes in sitting    2lbs shoulder strength 20 reps, 1-2 sets):   -Shoulder extension  -Shoulder external rotation  -Shoulder abduction mid-range  -Bicep curls    Posture education, told avoid siting in bed    Educated on home program, see below. Verbal, visual and tactile cues for there ex. There ex:   Retraction x10: after ROM a bit worse    L lateral flexion 3 minutes sustained: after worse ROM    L rotation sitting x10: after worse ROM    R rotation sitting x10: after worse ROM    Mid-Cervical retraction with pillow cases around neck at level of ear lobes x10 (2 sets) 30' and 3 minutes: better  (2 more sets done 1 minute each)    2-2lbs shoulder strength 20 reps, 1 set):   -Shoulder extension  -Shoulder external rotation  -Shoulder abduction mid-range  -Bicep curls    Educated on home program, see below. Verbal, visual and tactile cues for there ex. There ex:    Plank on knees 30'    Bird dog one arm x10    (After these, ROM of cervical spine is worse)    Scapular clock (retraction and depression 20x2)    Mid-cervical retraction with rolled towel behind neck 3 minutes sustained 3 sets and 20x2 with therapist overpressure. Told do this now in standing with upper back against pillows on wall    Educated on home program, see below. Verbal, visual and tactile cues for there ex. Manual         N Re-Ed  Educated on ideal sitting posture, practiced in clinic told to ice neck 3x, 20 minutes a day. Discussed plan of care.

## 2022-08-15 ENCOUNTER — APPOINTMENT (OUTPATIENT)
Dept: PHYSICAL THERAPY | Age: 72
End: 2022-08-15
Attending: FAMILY MEDICINE
Payer: MEDICARE

## 2022-08-17 ENCOUNTER — APPOINTMENT (OUTPATIENT)
Dept: PHYSICAL THERAPY | Age: 72
End: 2022-08-17
Attending: FAMILY MEDICINE
Payer: MEDICARE

## 2022-08-22 ENCOUNTER — OFFICE VISIT (OUTPATIENT)
Dept: PHYSICAL THERAPY | Age: 72
End: 2022-08-22
Attending: FAMILY MEDICINE
Payer: MEDICARE

## 2022-08-22 ENCOUNTER — APPOINTMENT (OUTPATIENT)
Dept: PHYSICAL THERAPY | Age: 72
End: 2022-08-22
Attending: FAMILY MEDICINE
Payer: MEDICARE

## 2022-08-22 PROCEDURE — 97110 THERAPEUTIC EXERCISES: CPT

## 2022-08-22 NOTE — PROGRESS NOTES
Insurance (Authorized # of Visits):  Medicare/[employer], 16 visits via plan of care. Diagnosis: Osteopenia of spine (M85.88)  Narrowing of intervertebral disc space (M99.79)        Referring Provider: Vinicio Gonzalez   Date of Evaluation:    7/22/2022  Precautions:  None  Next MD visit:   none scheduled  Date of Surgery: n/a               Subjective: Pain at its worst 6/10. New exercise helps with neck pain, but no improvement since last session. Did home program about 5x a day, much less than therapist asked for. Standing cervical retraction stretching bothered mid-back, did this exercise sitting instead. Objective:   Tested 8/22/2022:  (Pain with *)    Cervical AROM: (* denotes performed with pain)  Extension: 165  Sidebending: R 35; L 36*  Rotation: R 70; L 75  Retraction: mod/min    Nerve tension tests:   Radial nerve: appears intact bilaterally  Ulnar: R end range pain, min loss*, L intact  Median: R end range pain, min loss*, L appears intact      Tested 7/22/2022:  Palpation: warm to palpation in neck     Flexibility:   UE/Scapular   Upper Trap: R min loss; L min loss  Levator Scap: R min loss; L min loss  Pec Major: R intact; L intact  Scalenes: R min loss, L min loss        Strength:  Rhomboids: R 3+/5, L 3-/5  Mid trap: R 3-/5; L 3-/5  Lats: R 4-/5, L 3+/5  Low trap: R 3-/5; L 3-/5         Assessment: Given trial of sustained cervical extension stretching without retraction. Goals: (to be met in 16 visits)   Pt will improve FOTO score from 68/100 to 88/100 to display improved ability to drive  Pt will reduce pain at its worst from 10/10 to 7/10 to allow for improved ability to sleep  Pt will rotation ROM on R from 60 degrees to 85 degrees to allow for improved ability to turn head to do housework  Pt will be independent with home program to allow for maintenance of goals achieved in therapy.          Plan:  Progress strength (quadruped)    Exhaust cervical extension stretching sustained    Thoracic extension stretching if no better    See how she was doing standing cervical retraction standing with rolled pillow case at level of ear lobes (was form correct with this, reports this produced mid-back pain)    Future: could return to  mid-cervical retraction with towel overpressure. Charges: 3 there ex,    Total Timed Treatment: 40 min  Total Treatment Time: 40 min   Date: 8/12/2022  Tx#: 5 Date: 8/22/2022  Tx#: 6       There ex:    Sheeba Peed on knees 30'    Bird dog one arm x10    (After these, ROM of cervical spine is worse)    Scapular clock (retraction and depression 20x2)    Mid-cervical retraction with rolled towel behind neck 3 minutes sustained 3 sets and 20x2 with therapist overpressure. Told do this now in standing with upper back against pillows on wall    Educated on home program, see below. Verbal, visual and tactile cues for there ex.

## 2022-08-24 ENCOUNTER — APPOINTMENT (OUTPATIENT)
Dept: PHYSICAL THERAPY | Age: 72
End: 2022-08-24
Attending: FAMILY MEDICINE
Payer: MEDICARE

## 2022-08-26 ENCOUNTER — APPOINTMENT (OUTPATIENT)
Dept: PHYSICAL THERAPY | Age: 72
End: 2022-08-26
Attending: FAMILY MEDICINE
Payer: MEDICARE

## 2022-08-26 ENCOUNTER — TELEPHONE (OUTPATIENT)
Dept: PHYSICAL THERAPY | Facility: HOSPITAL | Age: 72
End: 2022-08-26

## 2022-08-31 ENCOUNTER — OFFICE VISIT (OUTPATIENT)
Dept: PHYSICAL THERAPY | Age: 72
End: 2022-08-31
Attending: FAMILY MEDICINE
Payer: MEDICARE

## 2022-08-31 PROCEDURE — 97112 NEUROMUSCULAR REEDUCATION: CPT

## 2022-08-31 PROCEDURE — 97110 THERAPEUTIC EXERCISES: CPT

## 2022-08-31 NOTE — PROGRESS NOTES
Progress Summary  Pt has attended 7 visits in Physical Therapy. Insurance (Authorized # of Visits):  Medicare/[employer]ARP, 16 visits via plan of care. Diagnosis: Osteopenia of spine (M85.88)  Narrowing of intervertebral disc space (M99.79)        Referring Provider: Renata Shay   Date of Evaluation:    7/22/2022  Precautions:  None  Next MD visit:   none scheduled  Date of Surgery: n/a               Subjective: Pain at its worst maybe 2/10. Feels much better. Did home program     Objective:   Tested 8/31/2022:  (Pain with *)    Cervical AROM: (* denotes performed with pain)  Extension: 165  Sidebending: R 35; L 30  Rotation: R 95; L 93  Retraction: min/nil    Nerve tension tests:   Radial nerve: appears intact bilaterally  Ulnar: R end range pain, full range*, L intact  Median: intact bilaterally. Tested 7/22/2022:  Palpation: warm to palpation in neck     Flexibility:   UE/Scapular   Upper Trap: R min loss; L min loss  Levator Scap: R min loss; L min loss  Pec Major: R intact; L intact  Scalenes: R min loss, L min loss        Strength:  Rhomboids: R 3+/5, L 3-/5  Mid trap: R 3-/5; L 3-/5  Lats: R 4-/5, L 3+/5  Low trap: R 3-/5; L 3-/5         Assessment: All goals met, ready for discharge to home program.       Goals: (to be met in 16 visits)   Pt will improve FOTO score from 68/100 to 88/100 to display improved ability to drive (Goal met 7/67/3531)  Pt will reduce pain at its worst from 10/10 to 7/10 to allow for improved ability to sleep  (Goal met 8/31/2022)  Pt will rotation ROM on R from 60 degrees to 85 degrees to allow for improved ability to turn head to do housework (Goal met 8/31/2022)  Pt will be independent with home program to allow for maintenance of goals achieved in therapy. (Goal met 8/31/2022)       . FOTO: 98/100    Plan: Continue home program    Patient/Family/Caregiver was advised of these findings, precautions, and treatment options and has agreed to actively participate in planning and for this course of care. Thank you for your referral. If you have any questions, please contact me at Dept: [phone]. Sincerely,  Electronically signed by therapist: Kaylah Duncan PT     Physician's certification required:  No  Please co-sign or sign and return this letter via fax as soon as possible to [phone]. I certify the need for these services furnished under this plan of treatment and while under my care. X___________________________________________________ Date____________________    Certification From: 2/56/6156  To:11/29/2022       Additional ideas:  Progress strength (quadruped)    Exhaust cervical extension stretching sustained    Thoracic extension stretching if no better    See how she was doing standing cervical retraction standing with rolled pillow case at level of ear lobes (was form correct with this, reports this produced mid-back pain)    Future: could return to  mid-cervical retraction with towel overpressure. Charges: 2 there ex, 1 neuro re-education   Total Timed Treatment: 40 min  Total Treatment Time: 40 min   Date: 8/12/2022  Tx#: 5 Date: 8/22/2022  Tx#: 6 8/31/2022  Tx#: 7      There ex:    Cassidy Vieyra on knees 30'    Bird dog one arm x10    (After these, ROM of cervical spine is worse)    Scapular clock (retraction and depression 20x2)    Mid-cervical retraction with rolled towel behind neck 3 minutes sustained 3 sets and 20x2 with therapist overpressure. Told do this now in standing with upper back against pillows on wall    Educated on home program, see below. Verbal, visual and tactile cues for there ex.    There ex:    Cervical extension sitting x10, 30', 3 minutes x4: better    Scapular clock (retraction and depression as well as protrusion and elevation 10-20x1-3): better cervical R rotation now full    Bicep curls, shoulder flexion, shoulder extension and also external rotation with neutral shoulder flexion 3lbs all 10-20x1    Motivational interviewing for more frequent home program.    Posture education sitting and sideling practiced in clinic. Educated on home program, see below. Verbal, visual and tactile cues for there ex. There ex:   Thoracic extension 20-30x2: second set with ball overpessure: after no better. Cervical extension sitting x10, 30', 3 minutes x3: better    Scapular clock (retraction and depression as well as protrusion and elevation 20x2: hand on counter    Educated on home program, see below. Verbal, visual and tactile cues for there ex. Neuro re-education 10 minutes:  Education on plan of care, pathology and long term care of neck. Posture education and how to re-assess neck progress.

## 2022-09-02 ENCOUNTER — APPOINTMENT (OUTPATIENT)
Dept: PHYSICAL THERAPY | Age: 72
End: 2022-09-02
Attending: FAMILY MEDICINE
Payer: MEDICARE

## 2022-09-07 ENCOUNTER — APPOINTMENT (OUTPATIENT)
Dept: PHYSICAL THERAPY | Age: 72
End: 2022-09-07
Attending: FAMILY MEDICINE
Payer: MEDICARE

## 2022-09-07 ENCOUNTER — PATIENT OUTREACH (OUTPATIENT)
Dept: FAMILY MEDICINE CLINIC | Facility: CLINIC | Age: 72
End: 2022-09-07

## 2022-09-09 ENCOUNTER — APPOINTMENT (OUTPATIENT)
Dept: PHYSICAL THERAPY | Age: 72
End: 2022-09-09
Attending: FAMILY MEDICINE
Payer: MEDICARE

## 2022-09-12 ENCOUNTER — APPOINTMENT (OUTPATIENT)
Dept: PHYSICAL THERAPY | Age: 72
End: 2022-09-12
Attending: FAMILY MEDICINE
Payer: MEDICARE

## 2022-09-14 ENCOUNTER — APPOINTMENT (OUTPATIENT)
Dept: PHYSICAL THERAPY | Age: 72
End: 2022-09-14
Attending: FAMILY MEDICINE
Payer: MEDICARE

## 2022-09-19 ENCOUNTER — APPOINTMENT (OUTPATIENT)
Dept: PHYSICAL THERAPY | Age: 72
End: 2022-09-19
Attending: FAMILY MEDICINE
Payer: MEDICARE

## 2022-09-21 ENCOUNTER — APPOINTMENT (OUTPATIENT)
Dept: PHYSICAL THERAPY | Age: 72
End: 2022-09-21
Attending: FAMILY MEDICINE
Payer: MEDICARE

## 2022-10-10 ENCOUNTER — TELEPHONE (OUTPATIENT)
Dept: FAMILY MEDICINE CLINIC | Facility: CLINIC | Age: 72
End: 2022-10-10

## 2022-10-26 ENCOUNTER — TELEPHONE (OUTPATIENT)
Dept: FAMILY MEDICINE CLINIC | Facility: CLINIC | Age: 72
End: 2022-10-26

## 2022-11-09 RX ORDER — BUSPIRONE HYDROCHLORIDE 30 MG/1
TABLET ORAL
Qty: 90 TABLET | Refills: 0 | Status: SHIPPED | OUTPATIENT
Start: 2022-11-09

## 2022-11-09 RX ORDER — ALENDRONATE SODIUM 70 MG/1
70 TABLET ORAL WEEKLY
Qty: 12 TABLET | Refills: 3 | Status: SHIPPED | OUTPATIENT
Start: 2022-11-09

## 2022-11-10 RX ORDER — BUSPIRONE HYDROCHLORIDE 30 MG/1
30 TABLET ORAL DAILY
Qty: 90 TABLET | Refills: 0 | OUTPATIENT
Start: 2022-11-10

## 2022-11-10 RX ORDER — ALENDRONATE SODIUM 70 MG/1
70 TABLET ORAL WEEKLY
Qty: 12 TABLET | Refills: 3 | OUTPATIENT
Start: 2022-11-10

## 2022-11-21 ENCOUNTER — PATIENT OUTREACH (OUTPATIENT)
Dept: FAMILY MEDICINE CLINIC | Facility: CLINIC | Age: 72
End: 2022-11-21

## 2022-12-15 ENCOUNTER — PATIENT OUTREACH (OUTPATIENT)
Dept: FAMILY MEDICINE CLINIC | Facility: CLINIC | Age: 72
End: 2022-12-15

## 2022-12-15 NOTE — PROGRESS NOTES
Called cell phone # [phone], someone answered and the call was disconnected. Called again went straight to . Left  if pt is still pt of dr Chasity Martinez, pt is due for AWV.

## 2022-12-16 NOTE — PROGRESS NOTES
Pt called back upset because we keep calling to setup AWV. Per pt she doent want to schedule this apt.  And for us to stop calling

## 2023-01-26 RX ORDER — BUSPIRONE HYDROCHLORIDE 30 MG/1
TABLET ORAL
Qty: 90 TABLET | Refills: 0 | Status: SHIPPED | OUTPATIENT
Start: 2023-01-26

## 2023-04-01 ENCOUNTER — PATIENT OUTREACH (OUTPATIENT)
Dept: FAMILY MEDICINE CLINIC | Facility: CLINIC | Age: 73
End: 2023-04-01

## 2023-05-03 RX ORDER — BUSPIRONE HYDROCHLORIDE 30 MG/1
30 TABLET ORAL DAILY
Qty: 30 TABLET | Refills: 0 | OUTPATIENT
Start: 2023-05-03

## 2023-05-03 RX ORDER — RIZATRIPTAN BENZOATE 10 MG/1
TABLET ORAL AS NEEDED
Qty: 6 TABLET | Refills: 0 | OUTPATIENT
Start: 2023-05-03

## 2023-05-03 RX ORDER — PROPRANOLOL HYDROCHLORIDE 40 MG/1
40 TABLET ORAL 2 TIMES DAILY
Qty: 180 TABLET | Refills: 0 | OUTPATIENT
Start: 2023-05-03

## 2023-05-03 NOTE — TELEPHONE ENCOUNTER
LOV 6/15/22 for neck pain. Osteoporosis Medication Protocol Failed 05/03/2023 02:14 PM   Protocol Details  DEXA scan within past 2 years    CMP within the past 12 months    Calcium level between 8.3 and 10.3    GFR level greater than 35    Appointment within past 12 or next 3 months     Future Appointments   Date Time Provider Sejal Mohr   6/13/2023 10:40 AM Gigi Mack MD [facility]        *Other 3 requests in this encounter denied because she called Nadeem Gutiérrez and requested them. See other refill encounter from today.

## 2023-05-03 NOTE — TELEPHONE ENCOUNTER
PROPRANOLOL 40 MG Oral Tab  Rizatriptan Benzoate 10 MG Oral Tab  busPIRone HCl 30 MG Oral Tab    Future Appointments   Date Time Provider Sejal Mohr   6/13/2023 10:40 AM Leatha Mendez MD [facility] [place]       Pt has an appt on June with Dr Vlad Sam,  Since dr is booked out so far, can pt get a bridging rx sent. She will be running out of her RX's soon. Emphasized the importance of keeping her appt. With Dr Vlad Sam,   Pt Verbalized understanding.

## 2023-05-04 RX ORDER — BUSPIRONE HYDROCHLORIDE 30 MG/1
30 TABLET ORAL DAILY
Qty: 30 TABLET | Refills: 0 | OUTPATIENT
Start: 2023-05-04

## 2023-05-04 RX ORDER — PROPRANOLOL HYDROCHLORIDE 40 MG/1
40 TABLET ORAL 2 TIMES DAILY
Qty: 60 TABLET | Refills: 0 | Status: SHIPPED | OUTPATIENT
Start: 2023-05-04

## 2023-05-04 RX ORDER — RIZATRIPTAN BENZOATE 10 MG/1
TABLET ORAL AS NEEDED
Qty: 6 TABLET | Refills: 0 | OUTPATIENT
Start: 2023-05-04

## 2023-05-11 RX ORDER — ALENDRONATE SODIUM 70 MG/1
70 TABLET ORAL WEEKLY
Qty: 12 TABLET | Refills: 3 | Status: CANCELLED | OUTPATIENT
Start: 2023-05-11

## 2023-06-13 ENCOUNTER — OFFICE VISIT (OUTPATIENT)
Dept: FAMILY MEDICINE CLINIC | Facility: CLINIC | Age: 73
End: 2023-06-13
Payer: MEDICARE

## 2023-06-13 VITALS
OXYGEN SATURATION: 98 % | WEIGHT: 143 LBS | HEART RATE: 61 BPM | TEMPERATURE: 98 F | DIASTOLIC BLOOD PRESSURE: 70 MMHG | RESPIRATION RATE: 18 BRPM | BODY MASS INDEX: 27 KG/M2 | HEIGHT: 61 IN | SYSTOLIC BLOOD PRESSURE: 120 MMHG

## 2023-06-13 DIAGNOSIS — M81.0 AGE-RELATED OSTEOPOROSIS WITHOUT CURRENT PATHOLOGICAL FRACTURE: ICD-10-CM

## 2023-06-13 DIAGNOSIS — Z79.899 MEDICATION MANAGEMENT: ICD-10-CM

## 2023-06-13 DIAGNOSIS — Z00.00 ANNUAL PHYSICAL EXAM: Primary | ICD-10-CM

## 2023-06-13 DIAGNOSIS — G43.709 CHRONIC MIGRAINE WITHOUT AURA WITHOUT STATUS MIGRAINOSUS, NOT INTRACTABLE: ICD-10-CM

## 2023-06-13 DIAGNOSIS — H61.22 IMPACTED CERUMEN OF LEFT EAR: ICD-10-CM

## 2023-06-13 DIAGNOSIS — K21.9 GASTROESOPHAGEAL REFLUX DISEASE WITHOUT ESOPHAGITIS: ICD-10-CM

## 2023-06-13 DIAGNOSIS — F41.9 ANXIETY: ICD-10-CM

## 2023-06-13 DIAGNOSIS — M99.79 NARROWING OF INTERVERTEBRAL DISC SPACE: ICD-10-CM

## 2023-06-13 LAB
ALBUMIN SERPL-MCNC: 4 G/DL (ref 3.4–5)
ALBUMIN/GLOB SERPL: 1.2 {RATIO} (ref 1–2)
ALP LIVER SERPL-CCNC: 57 U/L
ALT SERPL-CCNC: 29 U/L
ANION GAP SERPL CALC-SCNC: 6 MMOL/L (ref 0–18)
AST SERPL-CCNC: 21 U/L (ref 15–37)
BILIRUB SERPL-MCNC: 0.4 MG/DL (ref 0.1–2)
BUN BLD-MCNC: 19 MG/DL (ref 7–18)
CALCIUM BLD-MCNC: 9.2 MG/DL (ref 8.5–10.1)
CHLORIDE SERPL-SCNC: 111 MMOL/L (ref 98–112)
CHOLEST SERPL-MCNC: 264 MG/DL (ref ?–200)
CO2 SERPL-SCNC: 22 MMOL/L (ref 21–32)
CREAT BLD-MCNC: 1.09 MG/DL
ERYTHROCYTE [DISTWIDTH] IN BLOOD BY AUTOMATED COUNT: 13.8 %
FASTING PATIENT LIPID ANSWER: YES
FASTING STATUS PATIENT QL REPORTED: YES
GFR SERPLBLD BASED ON 1.73 SQ M-ARVRAT: 53 ML/MIN/1.73M2 (ref 60–?)
GLOBULIN PLAS-MCNC: 3.4 G/DL (ref 2.8–4.4)
GLUCOSE BLD-MCNC: 90 MG/DL (ref 70–99)
HCT VFR BLD AUTO: 42.6 %
HDLC SERPL-MCNC: 57 MG/DL (ref 40–59)
HGB BLD-MCNC: 13.5 G/DL
LDLC SERPL CALC-MCNC: 166 MG/DL (ref ?–100)
MCH RBC QN AUTO: 29.3 PG (ref 26–34)
MCHC RBC AUTO-ENTMCNC: 31.7 G/DL (ref 31–37)
MCV RBC AUTO: 92.6 FL
NONHDLC SERPL-MCNC: 207 MG/DL (ref ?–130)
OSMOLALITY SERPL CALC.SUM OF ELEC: 290 MOSM/KG (ref 275–295)
PLATELET # BLD AUTO: 182 10(3)UL (ref 150–450)
POTASSIUM SERPL-SCNC: 4.3 MMOL/L (ref 3.5–5.1)
PROT SERPL-MCNC: 7.4 G/DL (ref 6.4–8.2)
RBC # BLD AUTO: 4.6 X10(6)UL
SODIUM SERPL-SCNC: 139 MMOL/L (ref 136–145)
T4 FREE SERPL-MCNC: 0.7 NG/DL (ref 0.8–1.7)
TRIGL SERPL-MCNC: 220 MG/DL (ref 30–149)
TSI SER-ACNC: 23.2 MIU/ML (ref 0.36–3.74)
VLDLC SERPL CALC-MCNC: 44 MG/DL (ref 0–30)
WBC # BLD AUTO: 5.8 X10(3) UL (ref 4–11)

## 2023-06-13 PROCEDURE — 84443 ASSAY THYROID STIM HORMONE: CPT | Performed by: FAMILY MEDICINE

## 2023-06-13 PROCEDURE — 80053 COMPREHEN METABOLIC PANEL: CPT | Performed by: FAMILY MEDICINE

## 2023-06-13 PROCEDURE — 85027 COMPLETE CBC AUTOMATED: CPT | Performed by: FAMILY MEDICINE

## 2023-06-13 PROCEDURE — 80061 LIPID PANEL: CPT | Performed by: FAMILY MEDICINE

## 2023-06-13 PROCEDURE — 84439 ASSAY OF FREE THYROXINE: CPT | Performed by: FAMILY MEDICINE

## 2023-06-26 ENCOUNTER — TELEPHONE (OUTPATIENT)
Dept: FAMILY MEDICINE CLINIC | Facility: CLINIC | Age: 73
End: 2023-06-26

## 2023-06-26 NOTE — TELEPHONE ENCOUNTER
----- Message from Marleen Rush MD sent at 6/24/2023 12:30 PM CDT -----  Results reviewed. Please inform patient her TSH levels elevated and also her cholesterol levels are elevated. Other labs look okay. I would recommend we start on medication for her thyroid please schedule office visit or video visit to discuss medication.    For her cholesterol she can try low-fat diet and exercise and if willing I can also start her on medication but we can talk at the time of visit

## 2023-06-28 ENCOUNTER — TELEPHONE (OUTPATIENT)
Dept: FAMILY MEDICINE CLINIC | Facility: CLINIC | Age: 73
End: 2023-06-28

## 2023-06-28 ENCOUNTER — PATIENT MESSAGE (OUTPATIENT)
Dept: FAMILY MEDICINE CLINIC | Facility: CLINIC | Age: 73
End: 2023-06-28

## 2023-07-03 RX ORDER — PROPRANOLOL HYDROCHLORIDE 40 MG/1
40 TABLET ORAL 2 TIMES DAILY
Qty: 180 TABLET | Refills: 0 | Status: SHIPPED | OUTPATIENT
Start: 2023-07-03

## 2023-07-03 RX ORDER — RIZATRIPTAN BENZOATE 10 MG/1
TABLET ORAL AS NEEDED
Qty: 6 TABLET | Refills: 0 | Status: SHIPPED | OUTPATIENT
Start: 2023-07-03

## 2023-07-05 ENCOUNTER — TELEPHONE (OUTPATIENT)
Dept: FAMILY MEDICINE CLINIC | Facility: CLINIC | Age: 73
End: 2023-07-05

## 2023-07-05 NOTE — TELEPHONE ENCOUNTER
Fax received from [facility] stating the Rizatriptan in combination with Propranolol increases the concentration of Rizatriptan. Ok to proceed with this Rx?     Need to call pharmacy with answer: [phone]

## 2023-07-11 ENCOUNTER — HOSPITAL ENCOUNTER (OUTPATIENT)
Dept: MRI IMAGING | Facility: HOSPITAL | Age: 73
Discharge: HOME | End: 2023-07-11
Attending: FAMILY MEDICINE
Payer: MEDICARE

## 2023-07-11 DIAGNOSIS — G43.709 CHRONIC MIGRAINE WITHOUT AURA WITHOUT STATUS MIGRAINOSUS, NOT INTRACTABLE: ICD-10-CM

## 2023-07-11 DIAGNOSIS — M99.79 NARROWING OF INTERVERTEBRAL DISC SPACE: ICD-10-CM

## 2023-07-11 DIAGNOSIS — M81.0 AGE-RELATED OSTEOPOROSIS WITHOUT CURRENT PATHOLOGICAL FRACTURE: ICD-10-CM

## 2023-07-11 PROCEDURE — 72141 MRI NECK SPINE W/O DYE: CPT | Performed by: FAMILY MEDICINE

## 2023-07-13 ENCOUNTER — TELEMEDICINE (OUTPATIENT)
Dept: FAMILY MEDICINE CLINIC | Facility: CLINIC | Age: 73
End: 2023-07-13
Payer: MEDICARE

## 2023-07-13 DIAGNOSIS — G43.709 CHRONIC MIGRAINE WITHOUT AURA WITHOUT STATUS MIGRAINOSUS, NOT INTRACTABLE: Primary | ICD-10-CM

## 2023-07-13 DIAGNOSIS — M48.02 STENOSIS, CERVICAL SPINE: ICD-10-CM

## 2023-07-13 DIAGNOSIS — H10.31 ACUTE BACTERIAL CONJUNCTIVITIS OF RIGHT EYE: ICD-10-CM

## 2023-07-13 DIAGNOSIS — E03.9 HYPOTHYROIDISM, UNSPECIFIED TYPE: ICD-10-CM

## 2023-07-13 DIAGNOSIS — E78.5 HYPERLIPIDEMIA, UNSPECIFIED HYPERLIPIDEMIA TYPE: ICD-10-CM

## 2023-07-13 PROCEDURE — 99214 OFFICE O/P EST MOD 30 MIN: CPT | Performed by: FAMILY MEDICINE

## 2023-07-13 RX ORDER — RIZATRIPTAN BENZOATE 10 MG/1
10 TABLET ORAL AS NEEDED
Qty: 12 TABLET | Refills: 0 | Status: SHIPPED | OUTPATIENT
Start: 2023-07-13

## 2023-07-13 RX ORDER — CIPROFLOXACIN HYDROCHLORIDE 3.5 MG/ML
2 SOLUTION/ DROPS TOPICAL
Qty: 10 ML | Refills: 0 | Status: SHIPPED | OUTPATIENT
Start: 2023-07-13 | End: 2023-07-20

## 2023-07-13 RX ORDER — LEVOTHYROXINE SODIUM 0.03 MG/1
25 TABLET ORAL
Qty: 90 TABLET | Refills: 0 | Status: SHIPPED | OUTPATIENT
Start: 2023-07-13

## 2023-07-26 ENCOUNTER — MED REC SCAN ONLY (OUTPATIENT)
Dept: FAMILY MEDICINE CLINIC | Facility: CLINIC | Age: 73
End: 2023-07-26

## 2023-08-02 NOTE — PROGRESS NOTES
No chief complaint on file. Discuss mri result/  This visit is conducted using Telemedicine with live, interactive video and audio. Patient has been referred to the [facility] website at [email or web address] to review the yearly Consent to Treat document. Patient understands and accepts financial responsibility for any deductible, co-insurance and/or co-pays associated with this service. HPI:    Patient ID: Selin Baxter is a 74year old female. HPI   Discuss mri result shows she might has  prior hemorrhage in regien of right temporal occipital lobe. She did have head injury in past.  Denies any headache toda no tinglin numbness no vsion changes. Result discuss with radiology and no concern for recent bleed. She also has arthritis changes of cervical spine  Review of Systems  Pos neck pain      Current Outpatient Medications   Medication Sig Dispense Refill    levothyroxine 25 MCG Oral Tab Take 1 tablet (25 mcg total) by mouth before breakfast. 90 tablet 0    Rizatriptan Benzoate 10 MG Oral Tab Take 1 tablet (10 mg total) by mouth as needed for Migraine. 12 tablet 0    propranolol 40 MG Oral Tab Take 1 tablet (40 mg total) by mouth 2 (two) times daily. 180 tablet 0    Topiramate (TOPAMAX OR) Take 25 mg by mouth in the morning and 25 mg before bedtime. busPIRone HCl 30 MG Oral Tab Take 1 tablet (30 mg total) by mouth daily. 30 tablet 0    alendronate 70 MG Oral Tab Take 1 tablet (70 mg total) by mouth once a week. Take with 8oz water first thing in morning on empty stomach and stay upright for at least 30minutes 12 tablet 0    PREVIDENT 5000 DRY MOUTH 1.1 % Dental Gel Take 1 Bottle by mouth As Directed. fluticasone propionate 50 MCG/ACT Nasal Suspension 2 sprays by Each Nare route daily. 3 each 0    Cyanocobalamin (VITAMIN B 12 OR) Take by mouth. famoTIDine 20 MG Oral Tab Take 1 tablet (20 mg total) by mouth 2 (two) times daily.       Multiple Vitamin (MULTI-VITAMIN DAILY) Oral Tab Take by mouth. Allergies:  Latex                   RASH  Penicillins             SWELLING  Clindamycin             NAUSEA AND VOMITING  Thimerosal              OTHER (SEE COMMENTS)    Comment:Lost sight    HISTORY:  Past Medical History:   Diagnosis Date    Anxiety     Esophageal reflux     History of kidney stones     Migraine     stable for years    Osteoporosis     t score -4.2 2020      Past Surgical History:   Procedure Laterality Date    OTHER SURGICAL HISTORY  years ago    L wrist dquervains and L elbow surgery      Family History   Problem Relation Age of Onset    Other (Other) Mother         osteoporosis, has one kidney (not sure why)    Other (Other) Daughter         migraine, fibromyalgia    Other (Other) Sister         \"orthostatic tremors\"      Social History:   Social History     Socioeconomic History    Marital status:    Tobacco Use    Smoking status: Former     Types: Cigarettes     Quit date:      Years since quittin.5    Smokeless tobacco: Never   Vaping Use    Vaping Use: Never used   Substance and Sexual Activity    Alcohol use: Yes     Comment: 1 glass wine/night    Drug use: Never        PHYSICAL EXAM:    There were no vitals taken for this visit. Physical Exam  Constitutional:       General: She is not in acute distress. Appearance: She is not ill-appearing. Neurological:      General: No focal deficit present. Mental Status: She is alert. ASSESSMENT/PLAN:   1. Chronic migraine without aura without status migrainosus, not intractable  Meds refill  Refer to neuro  - NEURO - INTERNAL  - Rizatriptan Benzoate 10 MG Oral Tab; Take 1 tablet (10 mg total) by mouth as needed for Migraine. Dispense: 12 tablet; Refill: 0    2. Stenosis, cervical spine  Refer to neuro  - NEURO - INTERNAL    3.  Acute bacterial conjunctivitis of right eye  Meds sent  - ciprofloxacin 0.3 % Ophthalmic Solution; Place 2 drops into the right eye every 4 (four) hours while awake for 7 days. Dispense: 10 mL; Refill: 0    4. Hypothyroidism, unspecified type  Discuss labs result med stated  - levothyroxine 25 MCG Oral Tab; Take 1 tablet (25 mcg total) by mouth before breakfast.  Dispense: 90 tablet; Refill: 0    Hyperlipidemia disucss labd and low fat diet and exercise. She want to hold off on meds. No follow-ups on file.

## 2023-08-10 RX ORDER — TOPIRAMATE 25 MG/1
25 TABLET ORAL 2 TIMES DAILY
Qty: 180 TABLET | Refills: 0 | Status: SHIPPED | OUTPATIENT
Start: 2023-08-10

## 2023-08-10 NOTE — TELEPHONE ENCOUNTER
LOV: 7/13/23 for migraine    Topiramate (TOPAMAX OR)  Take 25 mg by mouth in the morning and 25 mg before bedtime.             Future Appointments   Date Time Provider Sejal Mohr   8/14/2023 11:20 AM Dannie Muse PA [facility] [employer]

## 2023-08-14 ENCOUNTER — OFFICE VISIT (OUTPATIENT)
Dept: NEUROLOGY | Facility: CLINIC | Age: 73
End: 2023-08-14
Payer: MEDICARE

## 2023-08-14 VITALS
HEIGHT: 61 IN | DIASTOLIC BLOOD PRESSURE: 60 MMHG | BODY MASS INDEX: 27 KG/M2 | WEIGHT: 143 LBS | RESPIRATION RATE: 16 BRPM | SYSTOLIC BLOOD PRESSURE: 134 MMHG | HEART RATE: 57 BPM

## 2023-08-14 DIAGNOSIS — M47.812 SPONDYLOSIS OF CERVICAL REGION WITHOUT MYELOPATHY OR RADICULOPATHY: ICD-10-CM

## 2023-08-14 DIAGNOSIS — R41.3 MEMORY DIFFICULTIES: ICD-10-CM

## 2023-08-14 DIAGNOSIS — G43.709 CHRONIC MIGRAINE WITHOUT AURA WITHOUT STATUS MIGRAINOSUS, NOT INTRACTABLE: Primary | ICD-10-CM

## 2023-08-14 DIAGNOSIS — G24.3 CERVICAL DYSTONIA: ICD-10-CM

## 2023-08-14 PROCEDURE — 99204 OFFICE O/P NEW MOD 45 MIN: CPT | Performed by: PHYSICIAN ASSISTANT

## 2023-08-14 RX ORDER — TIZANIDINE 2 MG/1
TABLET ORAL
Qty: 30 TABLET | Refills: 2 | Status: SHIPPED | OUTPATIENT
Start: 2023-08-14

## 2023-08-17 ENCOUNTER — TELEPHONE (OUTPATIENT)
Dept: NEUROLOGY | Facility: CLINIC | Age: 73
End: 2023-08-17

## 2023-08-17 DIAGNOSIS — G24.3 CERVICAL DYSTONIA: Primary | ICD-10-CM

## 2023-08-17 NOTE — TELEPHONE ENCOUNTER
Patient has Medicare as primary insurance no PA required    This is buy and bill    Once 100 units of Botox available call patient to schedule. Please notify the PA team of the date.

## 2023-08-17 NOTE — TELEPHONE ENCOUNTER
PA for Cervical Dystonia.  PA sent to Referral team for approval.       Semispinalis Capitis R 15 units Semispinalis Capitis L 15 units Trapezius R 15 units Trapezius L 15 units Levator Scapula R 10 units Levator Scapula L 10 units Occipitalis R 10 units Occipitalis L 10 units Total 100 units

## 2023-09-06 ENCOUNTER — OFFICE VISIT (OUTPATIENT)
Dept: NEUROLOGY | Facility: CLINIC | Age: 73
End: 2023-09-06
Payer: MEDICARE

## 2023-09-06 VITALS
BODY MASS INDEX: 27 KG/M2 | WEIGHT: 143 LBS | HEART RATE: 54 BPM | DIASTOLIC BLOOD PRESSURE: 62 MMHG | HEIGHT: 61 IN | SYSTOLIC BLOOD PRESSURE: 136 MMHG | RESPIRATION RATE: 16 BRPM

## 2023-09-06 DIAGNOSIS — G24.3 CERVICAL DYSTONIA: Primary | ICD-10-CM

## 2023-09-06 PROCEDURE — 64616 CHEMODENERV MUSC NECK DYSTON: CPT | Performed by: OTHER

## 2023-09-06 NOTE — PROGRESS NOTES
Paperwork noting that patient may bear financial responsibility for procedure(s) performed in clinic today signed prior to proceeding with procedure(s). Furthermore, patient notified that they should contact their insurer to verify that your procedure/test has been approved and is a COVERED benefit. Although the [facility] staff does its due diligence, the insurance carrier gives the disclaimer that \"Although the procedure is authorized, this does not guarantee payment. \"    Ultimately the patient is responsible for payment.     Botox is:  [x] Buy and Bill  [] Patient Supplied

## 2023-09-06 NOTE — PROGRESS NOTES
[address] [facility]  9/6/2023    3:26 PM      The patient relates her neck to be turned to the left and back along with elevation of the left shoulder. Maximum area of pain is in the left posterior occipital and neck region as well as shoulder blade region.     Using alcohol prep, 5 units at 3 sites injected in the occipitalis muscle on the left   (15)  10 units injected in the left semispinalis capitis  5 units injected in the left scalene  10 units left levator scapula  15 units in 3 locations left upper trapezius    Total units used: 55 units        Didier Scott MD  Vascular & General Neurology  Director, Multiple Sclerosis Program  [facility]  9/6/2023, Time completed 3:28 PM

## 2023-09-11 ENCOUNTER — TELEPHONE (OUTPATIENT)
Dept: NEUROLOGY | Facility: CLINIC | Age: 73
End: 2023-09-11

## 2023-09-11 NOTE — TELEPHONE ENCOUNTER
Pt having is reaction to Botox from 9/6/23. Just wondering what preservatives in are the shot b/c she is allergic to certain things.     Please call and advise

## 2023-09-11 NOTE — TELEPHONE ENCOUNTER
Patient had Botox on 9/6/2023. Patient reports numbness and tingling in her neck. Patient explained, \"It feels like a burning sensation. \" Patient rated her pain 6/10. RN advised she would report the symptoms to the provider for further direction. RN advised we will speak to the provider and inform her of any orders or suggestions from Dr. Yadira Delvalle.

## 2023-09-11 NOTE — TELEPHONE ENCOUNTER
Spoke with patient very nonanatomic distribution of the sensations are most likely myofascial related aggravation from the injection.   Advised stretching and massage just in hopefully this will get better

## 2023-09-12 ENCOUNTER — HOSPITAL ENCOUNTER (OUTPATIENT)
Dept: MRI IMAGING | Facility: HOSPITAL | Age: 73
Discharge: HOME | End: 2023-09-12
Attending: PHYSICIAN ASSISTANT
Payer: MEDICARE

## 2023-09-12 DIAGNOSIS — G43.709 CHRONIC MIGRAINE WITHOUT AURA WITHOUT STATUS MIGRAINOSUS, NOT INTRACTABLE: ICD-10-CM

## 2023-09-12 PROCEDURE — 70553 MRI BRAIN STEM W/O & W/DYE: CPT | Performed by: PHYSICIAN ASSISTANT

## 2023-09-12 PROCEDURE — A9575 INJ GADOTERATE MEGLUMI 0.1ML: HCPCS | Performed by: PHYSICIAN ASSISTANT

## 2023-09-12 RX ORDER — GADOTERATE MEGLUMINE 376.9 MG/ML
15 INJECTION INTRAVENOUS
Status: COMPLETED | OUTPATIENT
Start: 2023-09-12 | End: 2023-09-12

## 2023-09-12 RX ADMIN — GADOTERATE MEGLUMINE 14 ML: 376.9 INJECTION INTRAVENOUS at 14:33:00

## 2023-09-14 ENCOUNTER — LAB ENCOUNTER (OUTPATIENT)
Dept: LAB | Age: 73
End: 2023-09-14
Attending: PHYSICIAN ASSISTANT
Payer: MEDICARE

## 2023-09-14 DIAGNOSIS — R41.3 MEMORY DIFFICULTIES: ICD-10-CM

## 2023-09-14 DIAGNOSIS — E03.9 HYPOTHYROIDISM, UNSPECIFIED TYPE: ICD-10-CM

## 2023-09-14 LAB
FOLATE SERPL-MCNC: 16.1 NG/ML (ref 8.7–?)
T4 FREE SERPL-MCNC: 0.9 NG/DL (ref 0.8–1.7)
TSI SER-ACNC: 11.2 MIU/ML (ref 0.36–3.74)
VIT B12 SERPL-MCNC: 972 PG/ML (ref 193–986)

## 2023-09-14 PROCEDURE — 36415 COLL VENOUS BLD VENIPUNCTURE: CPT

## 2023-09-14 PROCEDURE — 84443 ASSAY THYROID STIM HORMONE: CPT

## 2023-09-14 PROCEDURE — 82607 VITAMIN B-12: CPT

## 2023-09-14 PROCEDURE — 82746 ASSAY OF FOLIC ACID SERUM: CPT

## 2023-09-14 PROCEDURE — 84439 ASSAY OF FREE THYROXINE: CPT

## 2023-09-15 DIAGNOSIS — E03.9 HYPOTHYROIDISM, UNSPECIFIED TYPE: Primary | ICD-10-CM

## 2023-09-23 DIAGNOSIS — G43.709 CHRONIC MIGRAINE WITHOUT AURA WITHOUT STATUS MIGRAINOSUS, NOT INTRACTABLE: ICD-10-CM

## 2023-09-25 RX ORDER — RIZATRIPTAN BENZOATE 10 MG/1
10 TABLET ORAL AS NEEDED
Qty: 12 TABLET | Refills: 1 | Status: SHIPPED | OUTPATIENT
Start: 2023-09-25

## 2023-09-25 NOTE — TELEPHONE ENCOUNTER
No refill protocol for this medication. Buspirone:  Last refill: 5/12/2023 #30 with 0 refills      Propanolol did pass protocol but there is an interaction with this med and tizanadine. Please review and send if appropriate. Last Visit: 7/13/2023 Telemed with Dr. Renata Shay  Next Visit:   No Future Appointments         Forward to Dr. Renata Shay please advise on refills. Patient is requesting #90 days. Thanks.

## 2023-09-25 NOTE — TELEPHONE ENCOUNTER
LOV 6/13/23 for a px. Medication Quantity Refills Start End   Rizatriptan Benzoate 10 MG Oral Tab 12 tablet 0 7/13/2023    Sig:   Take 1 tablet (10 mg total) by mouth as needed for Migraine.      Route:   Oral       Future Appointments   Date Time Provider Sejal Mohr   10/16/2023  1:00 PM Denia Muse, [address] [facility]   12/28/2023  1:40 PM Penny Marlow MD [facility]

## 2023-09-25 NOTE — TELEPHONE ENCOUNTER
Please inform her there is interaction between tizanidine and propranolol and propranolol increase concentration on tizanidine. Does she feel any se when she takes both meds.  ?

## 2023-09-26 RX ORDER — PROPRANOLOL HYDROCHLORIDE 40 MG/1
40 TABLET ORAL 2 TIMES DAILY
Qty: 180 TABLET | Refills: 1 | Status: SHIPPED | OUTPATIENT
Start: 2023-09-26 | End: 2023-10-16

## 2023-09-26 RX ORDER — BUSPIRONE HYDROCHLORIDE 30 MG/1
30 TABLET ORAL DAILY
Qty: 90 TABLET | Refills: 0 | Status: SHIPPED | OUTPATIENT
Start: 2023-09-26 | End: 2023-12-17

## 2023-09-27 ENCOUNTER — TELEPHONE (OUTPATIENT)
Dept: NEUROLOGY | Facility: CLINIC | Age: 73
End: 2023-09-27

## 2023-09-27 NOTE — TELEPHONE ENCOUNTER
Reviewed MRI Brain images with Dr. Gayathri Cerna and informed patient that finding showed age related changes and other  question of draining vein was benign finding and no need to repeat the scan.  She expressed full understanding

## 2023-09-27 NOTE — TELEPHONE ENCOUNTER
Pt's daughter calling, advised patient did not respond well to botox, and patient is still experiencing pain in her neck. States that the injection actually made pain worse. She is wondering if there is anything else she should try to address her neck pain further, that may be an alternative to botox. Please advise and call patient to advise options.

## 2023-09-27 NOTE — TELEPHONE ENCOUNTER
Pt's daughter calling, advised MRI brain scan ordered w/w.o contrast completed 9/12. States that they can only see results without contrast.     States patient would like a call due to results on mychart, says there is a mention of tumor protocol per results of MRI. Requesting a call back to patient to discuss results.

## 2023-10-16 ENCOUNTER — OFFICE VISIT (OUTPATIENT)
Dept: NEUROLOGY | Facility: CLINIC | Age: 73
End: 2023-10-16
Payer: MEDICARE

## 2023-10-16 VITALS
BODY MASS INDEX: 27.38 KG/M2 | DIASTOLIC BLOOD PRESSURE: 74 MMHG | HEART RATE: 59 BPM | HEIGHT: 61 IN | WEIGHT: 145 LBS | RESPIRATION RATE: 16 BRPM | SYSTOLIC BLOOD PRESSURE: 122 MMHG

## 2023-10-16 DIAGNOSIS — G43.709 CHRONIC MIGRAINE WITHOUT AURA WITHOUT STATUS MIGRAINOSUS, NOT INTRACTABLE: Primary | ICD-10-CM

## 2023-10-16 DIAGNOSIS — M47.22 CERVICAL RADICULOPATHY DUE TO OSTEOARTHRITIS OF SPINE: ICD-10-CM

## 2023-10-16 DIAGNOSIS — R41.3 MEMORY DIFFICULTIES: ICD-10-CM

## 2023-10-16 DIAGNOSIS — G24.3 CERVICAL DYSTONIA: ICD-10-CM

## 2023-10-16 PROCEDURE — 99213 OFFICE O/P EST LOW 20 MIN: CPT | Performed by: PHYSICIAN ASSISTANT

## 2023-10-16 RX ORDER — TIZANIDINE 4 MG/1
TABLET ORAL
Qty: 30 TABLET | Refills: 5 | Status: SHIPPED | OUTPATIENT
Start: 2023-10-16

## 2023-10-17 ENCOUNTER — LAB ENCOUNTER (OUTPATIENT)
Dept: LAB | Age: 73
End: 2023-10-17
Attending: FAMILY MEDICINE
Payer: MEDICARE

## 2023-10-17 DIAGNOSIS — E03.9 HYPOTHYROIDISM, UNSPECIFIED TYPE: ICD-10-CM

## 2023-10-17 LAB
T4 FREE SERPL-MCNC: 1 NG/DL (ref 0.8–1.7)
TSI SER-ACNC: 9.21 MIU/ML (ref 0.36–3.74)

## 2023-10-17 PROCEDURE — 84443 ASSAY THYROID STIM HORMONE: CPT

## 2023-10-17 PROCEDURE — 36415 COLL VENOUS BLD VENIPUNCTURE: CPT

## 2023-10-17 PROCEDURE — 84439 ASSAY OF FREE THYROXINE: CPT

## 2023-10-18 ENCOUNTER — TELEPHONE (OUTPATIENT)
Dept: FAMILY MEDICINE CLINIC | Facility: CLINIC | Age: 73
End: 2023-10-18

## 2023-10-18 DIAGNOSIS — E03.9 HYPOTHYROIDISM, UNSPECIFIED TYPE: Primary | ICD-10-CM

## 2023-10-18 RX ORDER — LEVOTHYROXINE SODIUM 0.05 MG/1
50 TABLET ORAL
Qty: 90 TABLET | Refills: 0 | Status: SHIPPED | OUTPATIENT
Start: 2023-10-18

## 2023-10-18 NOTE — TELEPHONE ENCOUNTER
----- Message from MARIS Quiroz sent at 10/18/2023 10:21 AM CDT -----  TSH remains elevated. Increase Levothyroxine to 50 mcg daily (can double up current dose until she runs out).  Recheck TSH in 6-8 weeks

## 2023-12-12 ENCOUNTER — LAB ENCOUNTER (OUTPATIENT)
Dept: LAB | Age: 73
End: 2023-12-12
Attending: FAMILY MEDICINE
Payer: MEDICARE

## 2023-12-12 DIAGNOSIS — E03.9 HYPOTHYROIDISM, UNSPECIFIED TYPE: ICD-10-CM

## 2023-12-12 LAB — TSI SER-ACNC: 8.56 MIU/ML (ref 0.36–3.74)

## 2023-12-12 PROCEDURE — 84443 ASSAY THYROID STIM HORMONE: CPT

## 2023-12-12 PROCEDURE — 36415 COLL VENOUS BLD VENIPUNCTURE: CPT

## 2023-12-15 DIAGNOSIS — E03.9 HYPOTHYROIDISM, UNSPECIFIED TYPE: Primary | ICD-10-CM

## 2023-12-15 NOTE — TELEPHONE ENCOUNTER
Patient advised. Verbalized understanding. States takes meds every day on an empty stomach.   has been feeling a lot more fatigue recently

## 2023-12-15 NOTE — TELEPHONE ENCOUNTER
----- Message from Veena Hopkins MD sent at 12/14/2023  5:18 PM CST -----  Results reviewed. Please inform patient tsh level is improved but still elevated. How is she feeling with fatigue ? Also is she taking meds regularly? Did she missed dose?

## 2023-12-17 DIAGNOSIS — G43.709 CHRONIC MIGRAINE WITHOUT AURA WITHOUT STATUS MIGRAINOSUS, NOT INTRACTABLE: ICD-10-CM

## 2023-12-18 RX ORDER — BUSPIRONE HYDROCHLORIDE 30 MG/1
30 TABLET ORAL DAILY
Qty: 90 TABLET | Refills: 0 | OUTPATIENT
Start: 2023-12-18

## 2023-12-18 RX ORDER — LEVOTHYROXINE SODIUM 0.07 MG/1
75 TABLET ORAL
Qty: 90 TABLET | Refills: 0 | Status: SHIPPED | OUTPATIENT
Start: 2023-12-18

## 2023-12-18 RX ORDER — PROPRANOLOL HYDROCHLORIDE 40 MG/1
40 TABLET ORAL 2 TIMES DAILY
Qty: 180 TABLET | Refills: 1 | Status: CANCELLED | OUTPATIENT
Start: 2023-12-18

## 2023-12-18 RX ORDER — RIZATRIPTAN BENZOATE 10 MG/1
10 TABLET ORAL AS NEEDED
Qty: 12 TABLET | Refills: 0 | OUTPATIENT
Start: 2023-12-18

## 2023-12-18 RX ORDER — RIZATRIPTAN BENZOATE 10 MG/1
10 TABLET ORAL AS NEEDED
Qty: 12 TABLET | Refills: 1 | Status: SHIPPED | OUTPATIENT
Start: 2023-12-18

## 2023-12-18 RX ORDER — BUSPIRONE HYDROCHLORIDE 30 MG/1
30 TABLET ORAL DAILY
Qty: 90 TABLET | Refills: 0 | Status: SHIPPED | OUTPATIENT
Start: 2023-12-18

## 2023-12-18 NOTE — TELEPHONE ENCOUNTER
Televisit 7/13/23 to discuss MRI result.    Medication Quantity Refills Start End   propranolol 40 MG Oral Tab (Discontinued) 180 tablet 1 9/26/2023 10/16/2023   Sig:   Take 1 tablet (40 mg total) by mouth 2 (two) times daily.     Route:   Oral         Hypertension Medications Protocol [id number]/18/2023 10:19 AM   Protocol Details Appointment in past 6 or next 3 months    CMP or BMP in past 12 months    Last serum creatinine< 2.0        Future Appointments   Date Time Provider Department Center   12/28/2023  1:40 PM Andrew Isaacs MD [facility]

## 2023-12-18 NOTE — TELEPHONE ENCOUNTER
Hypertension Medications Protocol [id number]/18/2023 03:16 PM   Protocol Details Appointment in past 6 or next 3 months    CMP or BMP in past 12 months    Last serum creatinine< 2.0      States also needs propranolol refills  Last OV 6/13/23  Last refill 9/26/23  Future Appointments   Date Time Provider Sejal Mohr   12/28/2023  1:40 PM Micah Pruitt MD [facility] Brissa Santiago

## 2023-12-18 NOTE — TELEPHONE ENCOUNTER
Ok I will increase dose of levothyroxine to 75mcg and repeat tsh in 6 to 8 wks. If tsh still not well controlled we can have her see endocrine.

## 2023-12-18 NOTE — TELEPHONE ENCOUNTER
Rx refill requested       propranolol 40 MG Oral Tab       [facility] [address] [place] [address] [place] [address] [facility] [phone], [phone]

## 2023-12-18 NOTE — TELEPHONE ENCOUNTER
LOV 7/13/23 to discuss test results. Medication Quantity Refills Start End   busPIRone HCl 30 MG Oral Tab 90 tablet 0 9/26/2023 --   Sig:   Take 1 tablet (30 mg total) by mouth daily. Route:   Oral       Medication Quantity Refills Start End   Rizatriptan Benzoate 10 MG Oral Tab 12 tablet 1 9/25/2023 --   Sig:   Take 1 tablet (10 mg total) by mouth as needed for Migraine. Route:   Oral       Future Appointments   Date Time Provider Sejal Mohr   12/28/2023  1:40 PM MD JESSICA Garcia      Px due in June.

## 2023-12-19 RX ORDER — PROPRANOLOL HYDROCHLORIDE 40 MG/1
40 TABLET ORAL 2 TIMES DAILY
Qty: 180 TABLET | Refills: 0 | Status: SHIPPED | OUTPATIENT
Start: 2023-12-19

## 2023-12-28 ENCOUNTER — OFFICE VISIT (OUTPATIENT)
Dept: NEUROLOGY | Facility: CLINIC | Age: 73
End: 2023-12-28
Payer: MEDICARE

## 2023-12-28 VITALS
RESPIRATION RATE: 16 BRPM | WEIGHT: 145 LBS | SYSTOLIC BLOOD PRESSURE: 146 MMHG | BODY MASS INDEX: 27.38 KG/M2 | DIASTOLIC BLOOD PRESSURE: 68 MMHG | HEIGHT: 61 IN | HEART RATE: 62 BPM

## 2023-12-28 DIAGNOSIS — G24.3 CERVICAL DYSTONIA: Primary | ICD-10-CM

## 2023-12-28 PROCEDURE — 64616 CHEMODENERV MUSC NECK DYSTON: CPT | Performed by: OTHER

## 2023-12-28 NOTE — PROGRESS NOTES
[address] [facility]  12/28/2023    4:07 PM    1.  Cervical dystonia          Using alcohol prep, 5 units at 3 sites injected in the occipitalis muscle on the left   (15)  10 units injected in the left semispinalis capitis  5 units left splenius Capitis  5 units injected in the left scalene  10 units left levator scapula  15 units in 3 locations left upper trapezius  10 units left Rhomboid     Total units used: 70 units   Discarded 30 units      Carmel Goodell MD  Vascular & General Neurology  Director, Multiple Sclerosis Program  [facility]  12/28/2023, Time completed 4:08 PM

## 2024-01-25 ENCOUNTER — MED REC SCAN ONLY (OUTPATIENT)
Dept: FAMILY MEDICINE CLINIC | Facility: CLINIC | Age: 74
End: 2024-01-25

## 2024-02-18 DIAGNOSIS — M81.0 AGE-RELATED OSTEOPOROSIS WITHOUT CURRENT PATHOLOGICAL FRACTURE: Primary | ICD-10-CM

## 2024-02-19 RX ORDER — ALENDRONATE SODIUM 70 MG/1
70 TABLET ORAL WEEKLY
Qty: 12 TABLET | Refills: 0 | Status: SHIPPED | OUTPATIENT
Start: 2024-02-19 | End: 2024-02-19

## 2024-02-19 RX ORDER — ALENDRONATE SODIUM 70 MG/1
70 TABLET ORAL WEEKLY
Qty: 12 TABLET | Refills: 0 | Status: SHIPPED | OUTPATIENT
Start: 2024-02-19 | End: 2024-05-19

## 2024-02-19 NOTE — TELEPHONE ENCOUNTER
Last visit 06/13/2023  Last refill 05/12/2023  Osteoporosis Medication Protocol [id number]/18/2024 08:20 AM   Protocol Details In person appointment or virtual visit in the past 6 mos or appointment in next 3 mos    DEXA scan within past 2 years    CMP within the past 12 months    Calcium level between 8.3 and 10.3    GFR level greater than 35

## 2024-02-19 NOTE — TELEPHONE ENCOUNTER
Last Dexa 3/2022, + Osteoporosis  Dexa due in March. Where would she like to have that done? Order pended

## 2024-02-22 ENCOUNTER — LAB ENCOUNTER (OUTPATIENT)
Dept: LAB | Age: 74
End: 2024-02-22
Attending: FAMILY MEDICINE
Payer: MEDICARE

## 2024-02-22 DIAGNOSIS — E78.5 HYPERLIPIDEMIA, UNSPECIFIED HYPERLIPIDEMIA TYPE: ICD-10-CM

## 2024-02-22 DIAGNOSIS — E03.9 HYPOTHYROIDISM, UNSPECIFIED TYPE: ICD-10-CM

## 2024-02-22 LAB
T3FREE SERPL-MCNC: 2.62 PG/ML (ref 2.4–4.2)
T4 FREE SERPL-MCNC: 1.4 NG/DL (ref 0.8–1.7)
TSI SER-ACNC: 0.08 MIU/ML (ref 0.36–3.74)

## 2024-02-22 PROCEDURE — 84481 FREE ASSAY (FT-3): CPT

## 2024-02-22 PROCEDURE — 84443 ASSAY THYROID STIM HORMONE: CPT

## 2024-02-22 PROCEDURE — 84439 ASSAY OF FREE THYROXINE: CPT

## 2024-02-22 PROCEDURE — 36415 COLL VENOUS BLD VENIPUNCTURE: CPT

## 2024-02-23 ENCOUNTER — TELEPHONE (OUTPATIENT)
Dept: FAMILY MEDICINE CLINIC | Facility: CLINIC | Age: 74
End: 2024-02-23

## 2024-02-23 DIAGNOSIS — E03.9 HYPOTHYROIDISM, UNSPECIFIED TYPE: Primary | ICD-10-CM

## 2024-02-23 RX ORDER — LEVOTHYROXINE SODIUM 0.05 MG/1
TABLET ORAL
Qty: 48 TABLET | Refills: 0 | Status: SHIPPED | OUTPATIENT
Start: 2024-02-23 | End: 2024-02-23

## 2024-02-23 RX ORDER — LEVOTHYROXINE SODIUM 0.05 MG/1
TABLET ORAL
Qty: 48 TABLET | Refills: 0 | Status: SHIPPED | OUTPATIENT
Start: 2024-02-23

## 2024-02-23 NOTE — TELEPHONE ENCOUNTER
[facility] sent  Levothyroxine 50mcg sent to pharmacy  Recalls placed  Referral placed    Dr Kym Osborne [phone]

## 2024-02-23 NOTE — TELEPHONE ENCOUNTER
----- Message from MARIS Emmanuel sent at 2/23/2024 11:14 AM CST -----  TSH too low. Per Dr. Kraus's last note, she wanted patient to see endocrinology if not controlled with this result. Needs to see Dr. Osborne  In meantime, take Levothyroxine 75 mcg Monday, Wednesday, Friday; Take Levothyroxine 50 mcg all other days  Recheck TSH in 6 weeks

## 2024-03-15 RX ORDER — PROPRANOLOL HYDROCHLORIDE 40 MG/1
40 TABLET ORAL 2 TIMES DAILY
Qty: 60 TABLET | Refills: 0 | Status: SHIPPED | OUTPATIENT
Start: 2024-03-15 | End: 2024-04-14

## 2024-03-15 NOTE — TELEPHONE ENCOUNTER
Hypertension Medications Protocol [id number]/14/2024 11:10 PM   Protocol Details Last BP reading less than 140/90    CMP or BMP in past 12 months    In person appointment or virtual visit in the past 12 mos or appointment in next 3 mos    EGFRCR or GFRNAA > 50     BP Readings from Last 3 Encounters:   12/28/23 146/68   10/16/23 122/74   09/06/23 136/62       Last OV 6/13/23  Last refill 12/19/23 #90 0 refill

## 2024-03-17 DIAGNOSIS — G43.709 CHRONIC MIGRAINE WITHOUT AURA WITHOUT STATUS MIGRAINOSUS, NOT INTRACTABLE: ICD-10-CM

## 2024-03-18 RX ORDER — RIZATRIPTAN BENZOATE 10 MG/1
10 TABLET ORAL AS NEEDED
Qty: 12 TABLET | Refills: 1 | Status: SHIPPED | OUTPATIENT
Start: 2024-03-18

## 2024-03-18 RX ORDER — PROPRANOLOL HYDROCHLORIDE 40 MG/1
40 TABLET ORAL 2 TIMES DAILY
Qty: 180 TABLET | Refills: 0 | OUTPATIENT
Start: 2024-03-18

## 2024-03-18 NOTE — TELEPHONE ENCOUNTER
Signed 3 days ago (3/15/2024):   propranolol 40 MG Oral Tab   Sig: Take 1 tablet (40 mg total) by mouth 2 (two) times daily.   Disp: 60 tablet    Refills: 0   End date: 4/14/2024   Signed by: Tara Rosario APRN   Encounter Details

## 2024-03-18 NOTE — TELEPHONE ENCOUNTER
VV 7/13/23 for migraines.  Pt sees Neuro - have them fill this??  Px due in June.    Medication Quantity Refills Start End   Rizatriptan Benzoate 10 MG Oral Tab 12 tablet 1 12/18/2023 --   Sig:   Take 1 tablet (10 mg total) by mouth as needed for Migraine.     Route:   Oral

## 2024-03-29 ENCOUNTER — TELEPHONE (OUTPATIENT)
Dept: FAMILY MEDICINE CLINIC | Facility: CLINIC | Age: 74
End: 2024-03-29

## 2024-03-29 RX ORDER — BUSPIRONE HYDROCHLORIDE 30 MG/1
30 TABLET ORAL DAILY
Qty: 90 TABLET | Refills: 0 | Status: SHIPPED | OUTPATIENT
Start: 2024-03-29

## 2024-03-29 NOTE — TELEPHONE ENCOUNTER
Last OV 6/13/23  Last refilled on 12/18/23 for # 90 with 0 refills  Future Appointments   Date Time Provider Department Center   4/3/2024  1:30 PM [facility] D[facility]1 [facility] [place]   5/14/2024  2:00 PM Shantelle Hankins DO [id number] [facility] [place]        Thank you.

## 2024-04-03 ENCOUNTER — TELEPHONE (OUTPATIENT)
Dept: FAMILY MEDICINE CLINIC | Facility: CLINIC | Age: 74
End: 2024-04-03

## 2024-04-03 ENCOUNTER — LAB ENCOUNTER (OUTPATIENT)
Dept: LAB | Age: 74
End: 2024-04-03
Attending: FAMILY MEDICINE
Payer: MEDICARE

## 2024-04-03 ENCOUNTER — HOSPITAL ENCOUNTER (OUTPATIENT)
Dept: BONE DENSITY | Age: 74
Discharge: HOME | End: 2024-04-03
Attending: NURSE PRACTITIONER
Payer: MEDICARE

## 2024-04-03 DIAGNOSIS — E03.9 HYPOTHYROIDISM, UNSPECIFIED TYPE: ICD-10-CM

## 2024-04-03 DIAGNOSIS — M81.0 AGE-RELATED OSTEOPOROSIS WITHOUT CURRENT PATHOLOGICAL FRACTURE: Primary | ICD-10-CM

## 2024-04-03 DIAGNOSIS — E78.5 HYPERLIPIDEMIA, UNSPECIFIED HYPERLIPIDEMIA TYPE: ICD-10-CM

## 2024-04-03 DIAGNOSIS — M81.0 AGE-RELATED OSTEOPOROSIS WITHOUT CURRENT PATHOLOGICAL FRACTURE: ICD-10-CM

## 2024-04-03 LAB
CHOLEST SERPL-MCNC: 284 MG/DL (ref ?–200)
FASTING PATIENT LIPID ANSWER: NO
HDLC SERPL-MCNC: 66 MG/DL (ref 40–59)
LDLC SERPL CALC-MCNC: 179 MG/DL (ref ?–100)
NONHDLC SERPL-MCNC: 218 MG/DL (ref ?–130)
TRIGL SERPL-MCNC: 212 MG/DL (ref 30–149)
TSI SER-ACNC: 1.24 MIU/ML (ref 0.36–3.74)
VLDLC SERPL CALC-MCNC: 44 MG/DL (ref 0–30)

## 2024-04-03 PROCEDURE — 36415 COLL VENOUS BLD VENIPUNCTURE: CPT

## 2024-04-03 PROCEDURE — 77080 DXA BONE DENSITY AXIAL: CPT | Performed by: NURSE PRACTITIONER

## 2024-04-03 PROCEDURE — 84443 ASSAY THYROID STIM HORMONE: CPT

## 2024-04-03 PROCEDURE — 80061 LIPID PANEL: CPT

## 2024-04-03 NOTE — TELEPHONE ENCOUNTER
----- Message from MARIS Jean sent at 4/3/2024  1:49 PM CDT -----  Dexa shows continued osteoporosis despite Fosamax therapy. Refer to Dr. Hankins  Recommend establishing with endo to discuss other possible options

## 2024-04-04 ENCOUNTER — TELEPHONE (OUTPATIENT)
Dept: FAMILY MEDICINE CLINIC | Facility: CLINIC | Age: 74
End: 2024-04-04

## 2024-04-04 DIAGNOSIS — E03.9 HYPOTHYROIDISM, UNSPECIFIED TYPE: ICD-10-CM

## 2024-04-04 DIAGNOSIS — E78.5 HYPERLIPIDEMIA, UNSPECIFIED HYPERLIPIDEMIA TYPE: Primary | ICD-10-CM

## 2024-04-04 RX ORDER — LEVOTHYROXINE SODIUM 0.07 MG/1
TABLET ORAL
COMMUNITY
Start: 2024-04-04

## 2024-04-04 NOTE — TELEPHONE ENCOUNTER
I have placed order for lipid panel but would recommend she find out from her insurance if lab will be covered so soon.

## 2024-04-04 NOTE — TELEPHONE ENCOUNTER
----- Message from Ely Kraus MD sent at 4/4/2024  4:05 PM CDT -----  Results reviewed. Please inform patient cholesterol level worsening. I highly recommend start meds. If willing can you sent atorvastatin 20mg daily 90 tablet no refills. and repeat lipid in 6 wks.   Also can you give her heart scan information please. Continue low fat diet and exercise. Thyroid level normal continue meds

## 2024-04-04 NOTE — TELEPHONE ENCOUNTER
Patient advised and verbalized understanding.  Patient states she was not fasting for the cholesterol lab  Patient would like recheck this in 1 month fasting prior to going on any medication.  Reminder placed - okay to check in 1 month?

## 2024-04-04 NOTE — TELEPHONE ENCOUNTER
----- Message from MARIS Jean sent at 4/4/2024  8:04 AM CDT -----  TSH now in normal range. Continue current dosing: Levothyroxine 75 mcg Monday, Wednesday, Friday; Take Levothyroxine 50 mcg all other days. Please make sure med list is up to date

## 2024-04-06 NOTE — TELEPHONE ENCOUNTER
Looks to me that it is only covered every 5 years. I wish someone had just told me to fast or they couldn’t do it then because I didn’t fast. I really would prefer not to take medication for the next five years for no reason. Any suggestions? Thank You, Roseanna

## 2024-04-08 NOTE — TELEPHONE ENCOUNTER
Usually lipid panel is covered every 3 to 6 month depending on her level (I don't think every5 yrs is correct). I recommend she call her insurance to confirm. I do highly recommend we start her on medication for high cholesterol. If she does not wish then can do otc fish oil. Healthy low fat diet and exercise repeat lipid in 3month

## 2024-04-13 DIAGNOSIS — G43.709 CHRONIC MIGRAINE WITHOUT AURA WITHOUT STATUS MIGRAINOSUS, NOT INTRACTABLE: ICD-10-CM

## 2024-04-15 RX ORDER — TIZANIDINE 4 MG/1
TABLET ORAL
Qty: 30 TABLET | Refills: 2 | Status: SHIPPED | OUTPATIENT
Start: 2024-04-15

## 2024-04-15 RX ORDER — ALENDRONATE SODIUM 70 MG/1
70 TABLET ORAL WEEKLY
Qty: 12 TABLET | Refills: 0 | Status: SHIPPED | OUTPATIENT
Start: 2024-04-15 | End: 2024-07-14

## 2024-04-15 NOTE — TELEPHONE ENCOUNTER
Medication: tiZANidine 4 MG      Date of last refill: 10/16/23 (#30/5R)  [place]14/24     Last office visit: 12/28/23  Due back to clinic per last office note:  per Botox  Date next office visit scheduled:    Future Appointments   Date Time Provider Department Center   5/14/2024  2:00 PM Shantelle Hankins DO [id number] [facility] [place]   6/20/2024  2:20 PM Ely Kraus MD [facility]GOSW [facility] [place]           Last OV note recommendation:    1. Cervical dystonia             Using alcohol prep, 5 units at 3 sites injected in the occipitalis muscle on the left   (15)  10 units injected in the left semispinalis capitis  5 units left splenius Capitis  5 units injected in the left scalene  10 units left levator scapula  15 units in 3 locations left upper trapezius  10 units left Rhomboid     Total units used: 70 units   Discarded 30 units

## 2024-04-15 NOTE — TELEPHONE ENCOUNTER
Last OV 6/13/23  Last refilled on 3/18/24 for # 12 with 1 refills  Future Appointments   Date Time Provider Department Center   5/14/2024  2:00 PM Shantelle Hankins DO [id number] [facility] [place]   6/20/2024  2:20 PM Ely Kraus MD [facility] [place]        Did she request refill?

## 2024-04-16 RX ORDER — RIZATRIPTAN BENZOATE 10 MG/1
10 TABLET ORAL AS NEEDED
Qty: 12 TABLET | Refills: 1 | OUTPATIENT
Start: 2024-04-16

## 2024-05-08 RX ORDER — LEVOTHYROXINE SODIUM 0.05 MG/1
TABLET ORAL
Qty: 48 TABLET | Refills: 0 | Status: SHIPPED | OUTPATIENT
Start: 2024-05-08

## 2024-05-08 NOTE — TELEPHONE ENCOUNTER
Levothyroxine last refilled 2/23/24  Future appointment with  6/20/24  LOV with  6/13/23 physical  Last TSH 4/3/24      Thyroid Medication Protocol [id number]/08/2024 09:39 AM   Protocol Details TSH in past 12 months    Last TSH value is normal    In person appointment or virtual visit in the past 12 mos or appointment in next 3 mos

## 2024-05-14 ENCOUNTER — TELEPHONE (OUTPATIENT)
Facility: CLINIC | Age: 74
End: 2024-05-14

## 2024-05-14 ENCOUNTER — OFFICE VISIT (OUTPATIENT)
Facility: CLINIC | Age: 74
End: 2024-05-14

## 2024-05-14 VITALS
HEIGHT: 61.5 IN | OXYGEN SATURATION: 96 % | WEIGHT: 145 LBS | DIASTOLIC BLOOD PRESSURE: 78 MMHG | HEART RATE: 80 BPM | BODY MASS INDEX: 27.03 KG/M2 | SYSTOLIC BLOOD PRESSURE: 138 MMHG

## 2024-05-14 DIAGNOSIS — E03.9 ACQUIRED HYPOTHYROIDISM: ICD-10-CM

## 2024-05-14 DIAGNOSIS — M81.0 AGE-RELATED OSTEOPOROSIS WITHOUT CURRENT PATHOLOGICAL FRACTURE: Primary | ICD-10-CM

## 2024-05-14 PROCEDURE — 99205 OFFICE O/P NEW HI 60 MIN: CPT | Performed by: STUDENT IN AN ORGANIZED HEALTH CARE EDUCATION/TRAINING PROGRAM

## 2024-05-14 NOTE — PATIENT INSTRUCTIONS
Return Visit   [ x ] Physician in 4-6 weeks   [  ] In person or video visit  [  ] In person only    [ x ] After visit summary   [ x ] Placed labs/imaging. Labs are to be drawn at 8A and fasting.      It was great seeing you today!    Today we discussed your thyroid and bone health:  -We reviewed your previous bone density scans and your most recent bone density from 4/2024  -Please continue fosamax at this time  -Once your complete your labs, we will plan on following up to discuss next steps (c-telopeptide and bone specific alkaline phosphatase are the 2 labs that may not be covered by insurance)   -We also switched your levothyroxine from 50/75 to 62.5 daily. You can keep me updated regarding how you are tolerating this at our follow up visit     Take care!  -Dr. Hankins

## 2024-05-14 NOTE — TELEPHONE ENCOUNTER
Received phone call from Sherine with [facility]. States rx that was sent today- Levothyroxine 62.5mcg daily will not be covered by patient's plan. The only brand name that comes in the 62.5mcg strength is Tirosint.    Patient's insurance won't cover Tirosint brand.    Levothyroxine does not come at that dose.     Routing to Dr. Hankins for next steps.     62.5mcg x 7 days = 437.5 mcg    Patient previously doing 75mcg M/W/F/Sun + 50mcg T/Th/Sa = 450mcg

## 2024-05-14 NOTE — H&P
ENDOCRINOLOGY, DIABETES & METABOLISM CONSULT NOTE   Date: 05/14/24  Name: Roseanna Simon      Subjective:    HISTORY OF PRESENT ILLNESS:   Roseanna Simon is a 73 year old female seen in consultation for her osteoporosis    Patient presents to the clinic for an initial bone health evaluation due to a history of DEXA confirmed osteoporosis since 2020.     OSTEOPOROSIS RISK FACTORS ASSESSMENT  Postmenopausal Yes, around late 50's    Maternal hx of osteoporosis or hx of parental hip fx:  Yes, mother with osteoporosis   Recent Fracture: No   Previous fracture after the age of 50:  No   Hx of kidney stones Yes, 1 episode around her late 30s   Frequent falls Yes, per daughter had a few falls last year from tripping/getting her foot caught. Daughter also notes she has an unsteady gait   Poor vision Nohx of cataract surgery   New or Worsening Back Pain NoIf sitting for a long time notes low back pain after repositioning    History of Vit D insufficiency:   Current Vitamin D supplementation: No  Daily vitamin D 1000 units daily (liquid)   Poor intake of calcium  Daily calcium intake: Yes, moderate intake with daily cheese and yogurt 3x/week  Daily calcium 90 mg    Caffeine intake Yes, 0.5-1 cup of coffee   Smoking No   Alcohol intake >3/d:  No   Hx of thyroid disease Yes, diagnosed with hypothyroidism around summer of 2023 when TSH was 23; started on LT4   Hx of calcium problems or hyperparathyroidism No   Previous treatment for osteoporosis Yes, started on Fosamax in 2020 with Dr. Mansfield   Malabsorption, chronic diarrhea, celiac sprue   No   History of RA  No   History of skeletal radiation No   History of eating disorder No     Intake of medications that cause bone loss:     Seizure medications: Topomax for HA for many years (10+)  PPI:  Pantoprazole for 15+ years      Treatment Contraindications:  Dysphagia: denies  Plans for dental procedures: saw dentist 4/2024 for cleaning     Allergies, PMH, SocHx and FHx reviewed  and updated as appropriate in Epic on   No outpatient medications have been marked as taking for the 5/14/24 encounter (Appointment) with Shantelle Hankins DO.      onabotulinumtoxinA (Botox) injection 100 Units  100 Units Injection Once Andrew Isaacs MD         Allergies   Allergen Reactions    Latex RASH    Penicillins SWELLING    Clindamycin NAUSEA AND VOMITING    Thimerosal OTHER (SEE COMMENTS)     Lost sight      Current Outpatient Medications   Medication Sig Dispense Refill    levothyroxine 50 MCG Oral Tab TAKE 1 TABLET BY MOUTH ON TUESDAY, THURSDAY, SATURDAY AND SUNDAY. TAKE ONE AND A HALF TABLETS ON MONDAYS, WEDNESDAYS AND FRIDAYS 48 tablet 0    tiZANidine 4 MG Oral Tab Take 1 tab po nightly 30 tablet 2    alendronate 70 MG Oral Tab Take 1 tablet (70 mg total) by mouth once a week. Take with 8oz water first thing in morning on empty stomach and stay upright for at least 30minutes 12 tablet 0    levothyroxine 75 MCG Oral Tab Take 75mcg Monday Wednesday Friday      busPIRone HCl 30 MG Oral Tab Take 1 tablet (30 mg total) by mouth daily. 90 tablet 0    Rizatriptan Benzoate 10 MG Oral Tab TAKE 1 TABLET BY MOUTH AS NEEDED FOR MIGRAINE HEADACHE. 12 tablet 1    PREVIDENT 5000 DRY MOUTH 1.1 % Dental Gel Take 1 Bottle by mouth As Directed.      fluticasone propionate 50 MCG/ACT Nasal Suspension 2 sprays by Each Nare route daily. 3 each 0    Cyanocobalamin (VITAMIN B 12 OR) Take by mouth.      famoTIDine 20 MG Oral Tab Take 1 tablet (20 mg total) by mouth 2 (two) times daily.      Multiple Vitamin (MULTI-VITAMIN DAILY) Oral Tab Take by mouth.       Past Medical History:    Anxiety    Esophageal reflux    History of kidney stones    Migraine    stable for years    Osteoporosis    t score -4.2 1/2020     Past Surgical History:   Procedure Laterality Date    Elbow fracture surgery Left     Other surgical history  years ago    L wrist dquervains and L elbow surgery     Social History     Socioeconomic History     Marital status:    Tobacco Use    Smoking status: Former     Current packs/day: 0.00     Types: Cigarettes     Quit date:      Years since quittin.3     Passive exposure: Never    Smokeless tobacco: Never   Vaping Use    Vaping status: Never Used   Substance and Sexual Activity    Alcohol use: Yes     Comment: 1 glass wine/night    Drug use: Never   Other Topics Concern    Caffeine Concern Yes     Comment: 1 cup a day    Exercise Yes     Comment: active household     Social Determinants of Health      [facility]    Social Connections    [facility]    Housing Stability     Family History   Problem Relation Age of Onset    Other (Other) Mother         osteoporosis, has one kidney (not sure why)    Migraines Sister     Other (Other) Sister         \"orthostatic tremors\"    Other (Other) Daughter         migraine, fibromyalgia       REVIEW OF SYSTEMS: 10 point ROS completed, refer to HPI for pertinent positives    Objective:   PHYSICAL EXAMINATION:  Vital Signs:   Vitals:    24 1400   BP: 138/78   Pulse: 80       General Appearance:  Alert, in no acute distress, well developed  Eyes:  normal conjunctivae, sclera  Ears/Nose/Mouth/Throat/Neck:  normal hearing, normal speech and no palpable thyroid nodules  Respiratory:  breathing comfortably on room air, clear to auscultation bilaterally  Cardiovascular:  regular rate and rhythm, no murmurs, S3 or S4, no peripheral edema  Psychiatric:  Oriented to person, place and time, appropriate mood & affect  Skin: Normal moisture and skin texture  Neuro: sensory grossly intact, motor grossly intact. normal gait.    Recent Labs: Personally reviewed in EPIC under lab tab on 2024  Interpretation: 2024 thyroid function within range, 2023 calcium 9.2, creatinine 1.09 with EGFR 53  Component      Latest Ref Rng 4/3/2024    TSH      0.358 - 3.740 mIU/mL 1.240        Radiology:  Independently visualized on 5/14/2024  I reviewed the patient's records from outside facility which revealed: Osteoporosis since 2020    DXA Scans:  Date L2-L4 BMD T-score % change Left Femoral Neck BMD T-score % change   4/3/2024 OS 0.748 -2.7 0.552 -2.7   3/30/2022 [facility] 0.706 -3.1 0.494 -3.2   1/13/2020 [facility] 0.582 -4.2 0.449 -3.6     3/30/2022 [facility] DXA  FINDINGS:   AP SPINE RESULTS:   REGION:       L1-L4   BMD:            0.706 g/cm2   T-SCORE:    -3.1   The change in BMD of L1-L4 since the prior exam is +21.3%.     LEFT HIP RESULTS:   REGION:       Femoral neck   BMD:            0.494 g/cm2   T-SCORE:    -3.2   REGION:       Total hip   BMD:            0.690 g/cm2   T-SCORE:    -2.1      1/13/2020 DXA [facility]  FINDINGS:   AP SPINE RESULTS:   REGION:       L1-L4   BMD:            0.582 g/cm2   T-SCORE:    -4.2     LEFT HIP RESULTS:   REGION:       Femoral neck   BMD:            0.449 g/cm2   T-SCORE:    -3.6   REGION:       Total hip   BMD:            0.648 g/cm2   T-SCORE:    -2.4            ASSESSMENT/PLAN:  Roseanna Simon is a 73 year old female seen in consultation for:    Age-related osteoporosis without current pathological fracture   Discussed pathophysiology of bone loss and clinical significance of DEXA scans with the patient.  The patient has confirmed osteoporosis with low T-scores in the lumbar spine and mean femoral necks.    Explained the patient's risk factors for osteoporosis which include age, postmenopausal female, maternal history of osteoporosis and history of pantoprazole for 15+ years   Recommended workup for secondary causes of osteoporosis, including SPEP, PTH, celiac screen, Alk Phos levels, and vitamin D levels.  Will wait for labs to result and discuss next steps at follow-up visit  Discussed the importance of adopting lifestyle measures, such as adequate calcium and vitamin D intake, exercise, smoking cessation, counseling on fall  prevention, and avoidance of heavy alcohol use, to reduce bone loss in postmenopausal women.  Suggested 1200 mg of elemental calcium daily (total diet plus supplement) and 1000 IU of vitamin D daily as general recommendations.  Will update these recommendations once her labs result  Recommended pharmacologic therapy for postmenopausal women with established osteoporosis or fragility fracture.  Discussed available treatment options for osteoporosis, including bisphosphonates (oral vs. IV), anabolics, Evenity, and Prolia injections, as well as their indications, risks, and benefits, including black box warnings.  Discussed concerns about an increased risk of vertebral fracture after discontinuation of denosumab, the need for indefinite administration of denosumab    Patient was started on Fosamax in 2020 and her recent bone density from April 2024 continues to show osteoporosis  Recommended DXA every two years for patients starting on therapy, with additional evaluation for contributing factors if a clinically significant BMD decrease or new fracture occurs.     Hypothyroidism  Continue current dosing: Levothyroxine 75 mcg Monday, Wednesday, Friday, Sunday; Take Levothyroxine 50 mcg all other days since 2/2024  Dicussed switching to 62.5 mcg darrion, will trial and if tolerating patient to inform me  Repeat thyroid labs in 6 months around November 2024       The above assessment and plan was discussed with the patient. The patient noted understanding and agreement with the plan listed above.      Visit Duration :A total of 60 minutes was spent today on obtaining history, reviewing pertinent labs, evaluating patient, providing multiple treatment options, reinforcing diet/exercise and compliance, and completing documentation.        Note to patient: The 21 Century Cures Act makes medical notes like these available to patients in the interest of transparency. However, be advised this is a medical document. It is intended as  peer to peer communication. It is written in medical language and may contain abbreviations or verbiage that are unfamiliar. It may appear blunt or direct. Medical documents are intended to carry relevant information, facts as evident, and the clinical opinion of the practitioner      Shantelle Hankins DO  Endocrinology, Diabetes & Metabolism   5/14/2024

## 2024-05-15 ENCOUNTER — TELEPHONE (OUTPATIENT)
Facility: CLINIC | Age: 74
End: 2024-05-15

## 2024-05-15 NOTE — TELEPHONE ENCOUNTER
Per Francy RN's note dated- 5/14/24   \"Patient previously doing 75mcg M/W/F/Sun + 50mcg T/Th/Sa = 450mcg\" .     5/15/24- Per Dr. Hankins:  \"Please have patient continue her current dose of 75/50. Thank you.\"      RN phoned pt; pt made aware of Dr. Hankins's above response/order. Pt verbalized understanding; pt confirmed is on above dosing schedule and will continue following schedule.      Closing encounter.

## 2024-05-16 ENCOUNTER — TELEPHONE (OUTPATIENT)
Dept: FAMILY MEDICINE CLINIC | Facility: CLINIC | Age: 74
End: 2024-05-16

## 2024-05-16 NOTE — TELEPHONE ENCOUNTER
Repeat lipid due  MCM sent  Future Appointments   Date Time Provider Department Center   6/6/2024  2:30 PM Shantelle Hankins DO [id number] [facility] [place]   6/20/2024  2:20 PM Ely Kraus MD [facility]W [facility] [place]

## 2024-05-21 ENCOUNTER — LAB ENCOUNTER (OUTPATIENT)
Dept: LAB | Age: 74
End: 2024-05-21
Attending: STUDENT IN AN ORGANIZED HEALTH CARE EDUCATION/TRAINING PROGRAM

## 2024-05-21 DIAGNOSIS — M81.0 AGE-RELATED OSTEOPOROSIS WITHOUT CURRENT PATHOLOGICAL FRACTURE: ICD-10-CM

## 2024-05-21 DIAGNOSIS — E78.5 HYPERLIPIDEMIA, UNSPECIFIED HYPERLIPIDEMIA TYPE: ICD-10-CM

## 2024-05-21 DIAGNOSIS — E03.9 ACQUIRED HYPOTHYROIDISM: ICD-10-CM

## 2024-05-21 LAB
ALBUMIN SERPL-MCNC: 4.1 G/DL (ref 3.4–5)
ANION GAP SERPL CALC-SCNC: 6 MMOL/L (ref 0–18)
BUN BLD-MCNC: 22 MG/DL (ref 9–23)
CALCIUM BLD-MCNC: 9.2 MG/DL (ref 8.5–10.1)
CHLORIDE SERPL-SCNC: 110 MMOL/L (ref 98–112)
CHOLEST SERPL-MCNC: 287 MG/DL (ref ?–200)
CO2 SERPL-SCNC: 24 MMOL/L (ref 21–32)
CREAT BLD-MCNC: 0.95 MG/DL
EGFRCR SERPLBLD CKD-EPI 2021: 62 ML/MIN/1.73M2 (ref 60–?)
FASTING PATIENT LIPID ANSWER: YES
GLUCOSE BLD-MCNC: 108 MG/DL (ref 70–99)
HDLC SERPL-MCNC: 62 MG/DL (ref 40–59)
LDLC SERPL CALC-MCNC: 205 MG/DL (ref ?–100)
NONHDLC SERPL-MCNC: 225 MG/DL (ref ?–130)
OSMOLALITY SERPL CALC.SUM OF ELEC: 294 MOSM/KG (ref 275–295)
PHOSPHATE SERPL-MCNC: 3.3 MG/DL (ref 2.5–4.9)
POTASSIUM SERPL-SCNC: 4.6 MMOL/L (ref 3.5–5.1)
PTH-INTACT SERPL-MCNC: 58.4 PG/ML (ref 18.5–88)
SODIUM SERPL-SCNC: 140 MMOL/L (ref 136–145)
THYROGLOB SERPL-MCNC: 286 U/ML (ref ?–60)
THYROPEROXIDASE AB SERPL-ACNC: 1009 U/ML (ref ?–60)
TRIGL SERPL-MCNC: 116 MG/DL (ref 30–149)
VIT D+METAB SERPL-MCNC: 42.3 NG/ML (ref 30–100)
VLDLC SERPL CALC-MCNC: 25 MG/DL (ref 0–30)

## 2024-05-21 PROCEDURE — 80069 RENAL FUNCTION PANEL: CPT

## 2024-05-21 PROCEDURE — 83970 ASSAY OF PARATHORMONE: CPT

## 2024-05-21 PROCEDURE — 86800 THYROGLOBULIN ANTIBODY: CPT

## 2024-05-21 PROCEDURE — 86376 MICROSOMAL ANTIBODY EACH: CPT

## 2024-05-21 PROCEDURE — 36415 COLL VENOUS BLD VENIPUNCTURE: CPT

## 2024-05-21 PROCEDURE — 80061 LIPID PANEL: CPT

## 2024-05-21 PROCEDURE — 82523 COLLAGEN CROSSLINKS: CPT

## 2024-05-21 PROCEDURE — 82306 VITAMIN D 25 HYDROXY: CPT

## 2024-05-21 PROCEDURE — 84080 ASSAY ALKALINE PHOSPHATASES: CPT

## 2024-05-23 LAB — ALKALINE PHOSPHATASE BONE SPECIFIC: 9 UG/L

## 2024-05-24 LAB — C-TELOPEPTIDE: 129 PG/ML

## 2024-05-24 RX ORDER — ALENDRONATE SODIUM 70 MG/1
TABLET ORAL
Qty: 12 TABLET | Refills: 0 | Status: SHIPPED | OUTPATIENT
Start: 2024-05-24

## 2024-05-24 NOTE — TELEPHONE ENCOUNTER
Osteoporosis Medication Protocol [id number]/24/2024 07:59 AM   Protocol Details In person appointment or virtual visit in the past 6 mos or appointment in next 3 mos    DEXA scan within past 2 years    CMP within the past 12 months    Calcium level between 8.3 and 10.3    GFR level greater than 35     Request for  ALENDRONATE 70 MG Oral Tab     LOV 6/13/23 with    Last refill   Future Appointments   Date Time Provider Department Center   6/6/2024  2:30 PM Shantelle Hankins DO [id number] [facility] [place]   6/20/2024  2:20 PM Ely Kraus MD [facility] [place]    Labs 4/15/24 12 tablets 0 refills

## 2024-05-25 ENCOUNTER — TELEPHONE (OUTPATIENT)
Dept: FAMILY MEDICINE CLINIC | Facility: CLINIC | Age: 74
End: 2024-05-25

## 2024-05-25 NOTE — TELEPHONE ENCOUNTER
----- Message from Eyl Kraus sent at 5/25/2024  8:57 AM CDT -----  Results reviewed. Please inform patient cholesterol level still elevated I do prefer to start meds. If she is interested schedule her for ov or vv with me to discuss meds. I highly recommend we start her on medication. Also can you give her heart scan information and phone number.

## 2024-06-06 ENCOUNTER — OFFICE VISIT (OUTPATIENT)
Facility: CLINIC | Age: 74
End: 2024-06-06
Payer: MEDICARE

## 2024-06-06 VITALS
SYSTOLIC BLOOD PRESSURE: 124 MMHG | HEIGHT: 61.5 IN | BODY MASS INDEX: 27.03 KG/M2 | WEIGHT: 145 LBS | DIASTOLIC BLOOD PRESSURE: 60 MMHG | OXYGEN SATURATION: 96 % | HEART RATE: 69 BPM

## 2024-06-06 DIAGNOSIS — M81.0 AGE-RELATED OSTEOPOROSIS WITHOUT CURRENT PATHOLOGICAL FRACTURE: Primary | ICD-10-CM

## 2024-06-06 PROCEDURE — 99214 OFFICE O/P EST MOD 30 MIN: CPT | Performed by: STUDENT IN AN ORGANIZED HEALTH CARE EDUCATION/TRAINING PROGRAM

## 2024-06-06 RX ORDER — ALENDRONATE SODIUM 70 MG/1
70 TABLET ORAL WEEKLY
Qty: 12 TABLET | Refills: 1 | Status: SHIPPED | OUTPATIENT
Start: 2024-06-06 | End: 2024-12-03

## 2024-06-06 NOTE — PROGRESS NOTES
ENDOCRINOLOGY, DIABETES & METABOLISM CONSULT NOTE   Initial Consult Date: 05/14/24  Name: Roseanna Simon      Subjective:    HISTORY OF PRESENT ILLNESS:   Roseanna Simon is a 73 year old female seen in consultation for her osteoporosis    Patient presents to the clinic for an initial bone health evaluation due to a history of DEXA confirmed osteoporosis since 2020.     OSTEOPOROSIS RISK FACTORS ASSESSMENT  Postmenopausal Yes, around late 50's    Maternal hx of osteoporosis or hx of parental hip fx:  Yes, mother with osteoporosis   Recent Fracture: No   Previous fracture after the age of 50:  No   Hx of kidney stones Yes, 1 episode around her late 30s   Frequent falls Yes, per daughter had a few falls last year from tripping/getting her foot caught. Daughter also notes she has an unsteady gait   Poor vision Nohx of cataract surgery   New or Worsening Back Pain NoIf sitting for a long time notes low back pain after repositioning    History of Vit D insufficiency:   Current Vitamin D supplementation: No  Daily vitamin D 1000 units daily (liquid)   Poor intake of calcium  Daily calcium intake: Yes, moderate intake with daily cheese and yogurt 3x/week  Daily calcium 90 mg    Caffeine intake Yes, 0.5-1 cup of coffee   Smoking No   Alcohol intake >3/d:  No   Hx of thyroid disease Yes, diagnosed with hypothyroidism around summer of 2023 when TSH was 23; started on LT4   Hx of calcium problems or hyperparathyroidism No   Previous treatment for osteoporosis Yes, started on Fosamax in 2020 with Dr. Mansfield   Malabsorption, chronic diarrhea, celiac sprue   No   History of RA  No   History of skeletal radiation No   History of eating disorder No     Intake of medications that cause bone loss:     Seizure medications: Topomax for HA for many years (10+)  PPI:  Pantoprazole for 15+ years      Treatment Contraindications:  Dysphagia: denies  Plans for dental procedures: saw dentist 4/2024 for cleaning    6/6/2024  Notes cold  intolerance, intermittent constipation  Denies falls or fractures since last visit  Saw dentist for cleaning      Allergies, PMH, SocHx and FHx reviewed and updated as appropriate in Epic on   No outpatient medications have been marked as taking for the 6/6/24 encounter (Office Visit) with Shantelle Hankins DO.      onabotulinumtoxinA (Botox) injection 100 Units  100 Units Injection Once Andrew Isaacs MD         Allergies   Allergen Reactions    Latex RASH    Penicillins SWELLING    Clindamycin NAUSEA AND VOMITING    Thimerosal OTHER (SEE COMMENTS)     Lost sight      Current Outpatient Medications   Medication Sig Dispense Refill    ALENDRONATE 70 MG Oral Tab Take 1 tablet by mouth once a week. Take with 8oz water first thing in morning on empty stomach and stay upright for at least 30minutes 12 tablet 0    levothyroxine 50 MCG Oral Tab TAKE 1 TABLET BY MOUTH ON TUESDAY, THURSDAY, SATURDAY AND SUNDAY. TAKE ONE AND A HALF TABLETS ON MONDAYS, WEDNESDAYS AND FRIDAYS 48 tablet 0    tiZANidine 4 MG Oral Tab Take 1 tab po nightly 30 tablet 2    levothyroxine 75 MCG Oral Tab Take 75mcg Monday Wednesday Friday      busPIRone HCl 30 MG Oral Tab Take 1 tablet (30 mg total) by mouth daily. 90 tablet 0    Rizatriptan Benzoate 10 MG Oral Tab TAKE 1 TABLET BY MOUTH AS NEEDED FOR MIGRAINE HEADACHE. 12 tablet 1    PREVIDENT 5000 DRY MOUTH 1.1 % Dental Gel Take 1 Bottle by mouth As Directed.      fluticasone propionate 50 MCG/ACT Nasal Suspension 2 sprays by Each Nare route daily. 3 each 0    Cyanocobalamin (VITAMIN B 12 OR) Take by mouth.      famoTIDine 20 MG Oral Tab Take 1 tablet (20 mg total) by mouth 2 (two) times daily.      Multiple Vitamin (MULTI-VITAMIN DAILY) Oral Tab Take by mouth.       Past Medical History:    Anxiety    Esophageal reflux    History of kidney stones    Migraine    stable for years    Osteoporosis    t score -4.2 1/2020     Past Surgical History:   Procedure Laterality Date    Elbow fracture  surgery Left     Other surgical history  years ago    L wrist dquervains and L elbow surgery     Social History     Socioeconomic History    Marital status:    Tobacco Use    Smoking status: Former     Current packs/day: 0.00     Types: Cigarettes     Quit date:      Years since quittin.4     Passive exposure: Never    Smokeless tobacco: Never   Vaping Use    Vaping status: Never Used   Substance and Sexual Activity    Alcohol use: Yes     Comment: 1 glass wine/night    Drug use: Never   Other Topics Concern    Caffeine Concern Yes     Comment: 1 cup a day    Exercise Yes     Comment: active household     Social Determinants of Health      [facility]    Social Connections    [facility] from [facility]    Housing Stability     Family History   Problem Relation Age of Onset    Other (Other) Mother         osteoporosis, has one kidney (not sure why)    Migraines Sister     Other (Other) Sister         \"orthostatic tremors\"    Other (Other) Daughter         migraine, fibromyalgia       REVIEW OF SYSTEMS: 10 point ROS completed, refer to HPI for pertinent positives    Objective:   PHYSICAL EXAMINATION:  Vital Signs:   Vitals:    24 1424   BP: 124/60   Pulse: 69       General Appearance:  Alert, in no acute distress, well developed  Eyes:  normal conjunctivae, sclera  Ears/Nose/Mouth/Throat/Neck:  normal hearing, normal speech and no palpable thyroid nodules  Respiratory:  breathing comfortably on room air, clear to auscultation bilaterally  Cardiovascular:  regular rate and rhythm, no murmurs, S3 or S4, no peripheral edema  Psychiatric:  Oriented to person, place and time, appropriate mood & affect  Skin: Normal moisture and skin texture  Neuro: sensory grossly intact, motor grossly intact. normal gait.    Recent Labs: Personally reviewed.   Interpretation: 2024 thyroid function  within range, 6/13/2023 calcium 9.2, creatinine 1.09 with EGFR 53  Component      Latest Ref Rng 4/3/2024   TSH      0.358 - 3.740 mIU/mL 1.240        Radiology:  Independently visualized.  I reviewed the patient's records from outside facility which revealed: Osteoporosis since 2020    DXA Scans:  Date L2-L4 BMD T-score % change Left Femoral Neck BMD T-score % change   4/3/2024 OS 0.748 -2.7 0.552 -2.7   3/30/2022 [facility] 0.706 -3.1 0.494 -3.2   1/13/2020 [facility] 0.582 -4.2 0.449 -3.6     3/30/2022 [facility] DXA  FINDINGS:   AP SPINE RESULTS:   REGION:       L1-L4   BMD:            0.706 g/cm2   T-SCORE:    -3.1   The change in BMD of L1-L4 since the prior exam is +21.3%.     LEFT HIP RESULTS:   REGION:       Femoral neck   BMD:            0.494 g/cm2   T-SCORE:    -3.2   REGION:       Total hip   BMD:            0.690 g/cm2   T-SCORE:    -2.1      1/13/2020 DXA [facility]  FINDINGS:   AP SPINE RESULTS:   REGION:       L1-L4   BMD:            0.582 g/cm2   T-SCORE:    -4.2     LEFT HIP RESULTS:   REGION:       Femoral neck   BMD:            0.449 g/cm2   T-SCORE:    -3.6   REGION:       Total hip   BMD:            0.648 g/cm2   T-SCORE:    -2.4            ASSESSMENT/PLAN:  Roseanna Simon is a 73 year old female seen in consultation for:    Age-related osteoporosis without current pathological fracture  - alendronate 70 MG Oral Tab; Take 1 tablet (70 mg total) by mouth once a week.  Dispense: 12 tablet; Refill: 1  - Alkaline Phosphatase, Bone Specific; Future  - C-Telopeptide ([employer]); Future  - Renal Function Panel; Future  - VITAMIN D, 25-HYDROXY [89291][Q]; Future  - PTH, Intact [E]; Future   Discussed pathophysiology of bone loss and clinical significance of DEXA scans with the patient.  The patient has confirmed osteoporosis with low T-scores in the lumbar spine and mean femoral necks.    Explained the patient's risk factors for osteoporosis which include age, postmenopausal female, maternal history of  osteoporosis and history of pantoprazole for 15+ years   Recommended workup for secondary causes of osteoporosis, including SPEP, PTH, celiac screen, Alk Phos levels, and vitamin D levels.  Will wait for labs to result and discuss next steps at follow-up visit  Discussed the importance of adopting lifestyle measures, such as adequate calcium and vitamin D intake, exercise, smoking cessation, counseling on fall prevention, and avoidance of heavy alcohol use, to reduce bone loss in postmenopausal women.  Suggested 1200 mg of elemental calcium daily (total diet plus supplement) and 1000 IU of vitamin D daily as general recommendations.  Will update these recommendations once her labs result  Recommended pharmacologic therapy for postmenopausal women with established osteoporosis or fragility fracture.  Discussed available treatment options for osteoporosis, including bisphosphonates (oral vs. IV), anabolics, Evenity, and Prolia injections, as well as their indications, risks, and benefits, including black box warnings.  Discussed concerns about an increased risk of vertebral fracture after discontinuation of denosumab, the need for indefinite administration of denosumab    Patient was started on Fosamax in 2020 and her recent bone density from April 2024 continues to show osteoporosis, but with improvement when compared to 2020 imaging. We discussed continuing current regimen to complete 5 years of therapy. Will discuss next steps at follow up visit.   Recommended DXA every two years for patients starting on therapy, with additional evaluation for contributing factors if a clinically significant BMD decrease or new fracture occurs.     Hypothyroidism  Continue current dosing: Levothyroxine 75 mcg Monday, Wednesday, Friday, Sunday; Take Levothyroxine 50 mcg all other days since 2/2024   Pt still endorses constiaption and fatigue. She is to keep me udpated regarding if she wants to swith to brand name    Repeat thyroid  labs in 6 months around November 2024     The above assessment and plan was discussed with the patient. The patient noted understanding and agreement with the plan listed above.      Visit Duration :A total of 30 minutes was spent today on obtaining history, reviewing pertinent labs, evaluating patient, providing multiple treatment options, reinforcing diet/exercise and compliance, and completing documentation.        Note to patient: The 21 Century Cures Act makes medical notes like these available to patients in the interest of transparency. However, be advised this is a medical document. It is intended as peer to peer communication. It is written in medical language and may contain abbreviations or verbiage that are unfamiliar. It may appear blunt or direct. Medical documents are intended to carry relevant information, facts as evident, and the clinical opinion of the practitioner      Shantelle Hankins DO  Endocrinology, Diabetes & Metabolism   6/6/2024

## 2024-06-06 NOTE — PATIENT INSTRUCTIONS
Return Visit   [  ] Physician around 2/2025  [  ] In person or video visit  [  ] In person only    [ x ] After visit summary   [ x ] Placed labs/imaging. Labs are to be drawn at 8A and fasting.      It was great seeing you today!    Today we discussed your thyroid, bone health, and cholesterol:  -Please follow up with your PCP regarding starting a statin medication  -Continue your current levothyroxine dose. Please let me know if you would liek to trial Synthroid  -Repeat labs prior to 2/2025 follow up  -Continue Fosamax to complete a total of 5 years of therapy     Take care!  -Dr. Hankins

## 2024-06-14 RX ORDER — LEVOTHYROXINE SODIUM 0.05 MG/1
TABLET ORAL
Qty: 135 TABLET | Refills: 0 | Status: SHIPPED | OUTPATIENT
Start: 2024-06-14

## 2024-06-14 NOTE — TELEPHONE ENCOUNTER
LOV: 6/13/23 for physical      Thyroid Medication Protocol [id number]/14/2024 12:31 PM   Protocol Details TSH in past 12 months    Last TSH value is normal    In person appointment or virtual visit in the past 12 mos or appointment in next 3 mos        Future Appointments   Date Time Provider Department Center   6/20/2024  2:20 PM Ely Kraus MD [facility]   2/10/2025 11:00 AM Shantelle Hankins DO [id number]

## 2024-06-20 ENCOUNTER — OFFICE VISIT (OUTPATIENT)
Dept: FAMILY MEDICINE CLINIC | Facility: CLINIC | Age: 74
End: 2024-06-20

## 2024-06-20 VITALS
HEIGHT: 61.5 IN | RESPIRATION RATE: 16 BRPM | OXYGEN SATURATION: 96 % | SYSTOLIC BLOOD PRESSURE: 112 MMHG | DIASTOLIC BLOOD PRESSURE: 68 MMHG | TEMPERATURE: 98 F | BODY MASS INDEX: 27.21 KG/M2 | HEART RATE: 65 BPM | WEIGHT: 146 LBS

## 2024-06-20 DIAGNOSIS — M81.0 AGE-RELATED OSTEOPOROSIS WITHOUT CURRENT PATHOLOGICAL FRACTURE: ICD-10-CM

## 2024-06-20 DIAGNOSIS — G43.709 CHRONIC MIGRAINE WITHOUT AURA WITHOUT STATUS MIGRAINOSUS, NOT INTRACTABLE: ICD-10-CM

## 2024-06-20 DIAGNOSIS — E03.9 HYPOTHYROIDISM, UNSPECIFIED TYPE: ICD-10-CM

## 2024-06-20 DIAGNOSIS — F41.9 ANXIETY: ICD-10-CM

## 2024-06-20 DIAGNOSIS — K21.9 GASTROESOPHAGEAL REFLUX DISEASE WITHOUT ESOPHAGITIS: ICD-10-CM

## 2024-06-20 DIAGNOSIS — E78.5 HYPERLIPIDEMIA, UNSPECIFIED HYPERLIPIDEMIA TYPE: ICD-10-CM

## 2024-06-20 DIAGNOSIS — Z23 NEED FOR VACCINATION: ICD-10-CM

## 2024-06-20 DIAGNOSIS — Z00.00 MEDICARE ANNUAL WELLNESS VISIT, SUBSEQUENT: Primary | ICD-10-CM

## 2024-06-20 PROCEDURE — G0009 ADMIN PNEUMOCOCCAL VACCINE: HCPCS | Performed by: FAMILY MEDICINE

## 2024-06-20 PROCEDURE — 90677 PCV20 VACCINE IM: CPT | Performed by: FAMILY MEDICINE

## 2024-06-20 PROCEDURE — G0439 PPPS, SUBSEQ VISIT: HCPCS | Performed by: FAMILY MEDICINE

## 2024-06-20 RX ORDER — PROPRANOLOL HYDROCHLORIDE 40 MG/1
40 TABLET ORAL 3 TIMES DAILY
COMMUNITY

## 2024-06-20 RX ORDER — ROSUVASTATIN CALCIUM 5 MG/1
5 TABLET, COATED ORAL NIGHTLY
Qty: 90 TABLET | Refills: 0 | Status: SHIPPED | OUTPATIENT
Start: 2024-06-20

## 2024-06-20 NOTE — PROGRESS NOTES
Subjective:   Roseanna Simon is a 73 year old female who presents for a Medicare Subsequent Annual Wellness visit (Pt already had Initial Annual Wellness) and scheduled follow up of multiple significant but stable problems.   Doing well  Hypothyroidism on meds.   Seeing endo for osteoporosis.  Neurology for headache. She was given botox. She take muscle relaxer on and off. She is not seeing them now.  ENT Dr. Bravo for her ears.  No falls.   Hypothyroidism- she is taking m w f sun 75 t and t and sa 50.   Mammogram- she decline politely.  Colonoscopy offered she decline politely.     History/Other:   Fall Risk Assessment:   She has been screened for Falls and is High Risk. Fall Prevention information provided to patient in After Visit Summary.    Do you feel unsteady when standing or walking?: Yes  Do you worry about falling?: Yes  Have you fallen in the past year?: No     Cognitive Assessment:   She had a completely normal cognitive assessment - see flowsheet entries       Functional Ability/Status:   Roseanna Simon has some abnormal functions as listed below:  She has Vision problems based on screening of functional status. She has Walking problems based on screening of functional status.       Depression Screening (PHQ-2/PHQ-9): PHQ-2 SCORE: 0  , done 6/20/2024            Advanced Directives:   She does have a Living Will but we do NOT have it on file in Epic.    She does have a POA but we do NOT have it on file in Epic.    Discussed Advance Care Planning with patient (and family/surrogate if present). Standard forms made available to patient in After Visit Summary.      Patient Active Problem List   Diagnosis    Gastroesophageal reflux disease without esophagitis    Anxiety    Age-related osteoporosis without current pathological fracture    Chronic migraine without aura without status migrainosus, not intractable     Allergies:  She is allergic to latex, penicillins, clindamycin, and  thimerosal.    Current Medications:  Outpatient Medications Marked as Taking for the 6/20/24 encounter (Office Visit) with Ely Kraus MD   Medication Sig    Ergocalciferol (VITAMIN D OR) Take 5,000 Units by mouth daily. Liquid form    propranolol 40 MG Oral Tab Take 1 tablet (40 mg total) by mouth 3 (three) times daily.    rosuvastatin 5 MG Oral Tab Take 1 tablet (5 mg total) by mouth nightly.    alendronate 70 MG Oral Tab Take 1 tablet (70 mg total) by mouth once a week.    tiZANidine 4 MG Oral Tab Take 1 tab po nightly    levothyroxine 75 MCG Oral Tab Take 75mcg Monday Wednesday Friday Sunday    busPIRone HCl 30 MG Oral Tab Take 1 tablet (30 mg total) by mouth daily.    Rizatriptan Benzoate 10 MG Oral Tab TAKE 1 TABLET BY MOUTH AS NEEDED FOR MIGRAINE HEADACHE.    fluticasone propionate 50 MCG/ACT Nasal Suspension 2 sprays by Each Nare route daily.    Cyanocobalamin (VITAMIN B 12 OR) Take by mouth.    famoTIDine 20 MG Oral Tab Take 1 tablet (20 mg total) by mouth 2 (two) times daily.    Multiple Vitamin (MULTI-VITAMIN DAILY) Oral Tab Take by mouth.     Current Facility-Administered Medications for the 6/20/24 encounter (Office Visit) with Ely Kraus MD   Medication    onabotulinumtoxinA (Botox) injection 100 Units       Medical History:  She  has a past medical history of Anxiety, Esophageal reflux, History of kidney stones, Hypothyroidism, Migraine, and Osteoporosis.  Surgical History:  She  has a past surgical history that includes other surgical history (years ago) and elbow fracture surgery (Left).   Family History:  Her family history includes Migraines in her sister; Other in her daughter, mother, and sister.  Social History:  She  reports that she quit smoking about 22 years ago. Her smoking use included cigarettes. She has never been exposed to tobacco smoke. She has never used smokeless tobacco. She reports current alcohol use. She reports that she does not use drugs.    Tobacco:  She smoked  tobacco in the past but quit greater than 12 months ago.  Social History     Tobacco Use   Smoking Status Former    Current packs/day: 0.00    Types: Cigarettes    Quit date:     Years since quittin.4    Passive exposure: Never   Smokeless Tobacco Never        CAGE Alcohol Screen:   CAGE screening score of 0 on 2024, showing low risk of alcohol abuse.      Patient Care Team:  Ely Kraus MD as PCP - General (Family Medicine)  Good Perez PT as Physical Therapist (Physical Therapy)    Review of Systems  Neg cough cold congestion fever chest pain sob blood in stool blood in urine no vaginal bleeding.     Objective:   Physical Exam  Constitutional:       General: She is not in acute distress.     Appearance: She is not ill-appearing.   HENT:      Right Ear: Tympanic membrane normal.      Left Ear: Tympanic membrane normal.      Nose: No congestion or rhinorrhea.      Mouth/Throat:      Pharynx: No oropharyngeal exudate or posterior oropharyngeal erythema.   Cardiovascular:      Rate and Rhythm: Normal rate and regular rhythm.      Pulses: Normal pulses.      Heart sounds: Normal heart sounds.   Pulmonary:      Effort: Pulmonary effort is normal.      Breath sounds: Normal breath sounds.   Abdominal:      Palpations: Abdomen is soft.   Musculoskeletal:      Right lower leg: No edema.      Left lower leg: No edema.   Skin:     General: Skin is warm.      Capillary Refill: Capillary refill takes less than 2 seconds.   Neurological:      Mental Status: She is alert.           /68   Pulse 65   Temp 97.7 °F (36.5 °C)   Resp 16   Ht 5' 1.5\" (1.562 m)   Wt 146 lb (66.2 kg)   SpO2 96%   BMI 27.14 kg/m²  Estimated body mass index is 27.14 kg/m² as calculated from the following:    Height as of this encounter: 5' 1.5\" (1.562 m).    Weight as of this encounter: 146 lb (66.2 kg).    Medicare Hearing Assessment:   Hearing Screening    Time taken: 2024  2:28 PM  Screening Method: Finger  Rub  Finger Rub Result: Pass         Visual Acuity:   Right Eye Visual Acuity: Corrected Right Eye Chart Acuity: 20/25   Left Eye Visual Acuity: Corrected Left Eye Chart Acuity: 20/25   Both Eyes Visual Acuity: Corrected Both Eyes Chart Acuity: 20/20   Able To Tolerate Visual Acuity: Yes        Assessment & Plan:   Roseanna Simon is a 73 year old female who presents for a Medicare Assessment.     1. Medicare annual wellness visit, subsequent (Primary)  Doing well.   Mammogram politely decline.  Colonoscopy decline.  Labs ordered.      2. Hyperlipidemia, unspecified hyperlipidemia type  Start meds    -     CT CALCIUM SCORING; Future; Expected date: 06/20/2024  -     Rosuvastatin Calcium; Take 1 tablet (5 mg total) by mouth nightly.  Dispense: 90 tablet; Refill: 0  -     Lipid Panel; Future; Expected date: 07/20/2024  -     Comp Metabolic Panel (14); Future; Expected date: 07/20/2024  -     TSH W Reflex To Free T4; Future; Expected date: 07/20/2024  3. Need for vaccination  Given.  -     Prevnar 20 (PCV20) [01875]  4. Hypothyroidism, unspecified type  Continue meds  -     TSH W Reflex To Free T4; Future; Expected date: 07/20/2024  5. Age-related osteoporosis without current pathological fracture  Seeing endo  Overview:  possibly d/t hx of steroid shots, PPI use, also genetics    6. Chronic migraine without aura without status migrainosus, not intractable  Seeing neuro  7. Anxiety  stable  8. Gastroesophageal reflux disease without esophagitis  stable  The patient indicates understanding of these issues and agrees to the plan.  Patient reassured.  Reinforced healthy diet, lifestyle, and exercise.      Return in about 6 months (around 12/20/2024) for DR. MCKENZIE.     Ely Mckenzie MD, 6/20/2024     Supplementary Documentation:   General Health:  In the past six months, have you lost more than 10 pounds without trying?: 2 - No  Has your appetite been poor?: No  Type of Diet: Balanced  How does the patient maintain a  good energy level?: Daily Walks  How would you describe your daily physical activity?: Moderate  How would you describe your current health state?: Fair  How do you maintain positive mental well-being?: Visiting Family  On a scale of 0 to 10, with 0 being no pain and 10 being severe pain, what is your pain level?: 3 - (Mild)  In the past six months, have you experienced urine leakage?: 0-No  At any time do you feel concerned for the safety/well-being of yourself and/or your children, in your home or elsewhere?: No  Have you had any immunizations at another office such as Influenza, Hepatitis B, Tetanus, or Pneumococcal?: Yes       Roseanna Simon's SCREENING SCHEDULE   Tests on this list are recommended by your physician but may not be covered, or covered at this frequency, by your insurer.   Please check with your insurance carrier before scheduling to verify coverage.   PREVENTATIVE SERVICES FREQUENCY &  COVERAGE DETAILS LAST COMPLETION DATE   Diabetes Screening    Fasting Blood Sugar /  Glucose    One screening every 12 months if never tested or if previously tested but not diagnosed with pre-diabetes   One screening every 6 months if diagnosed with pre-diabetes Lab Results   Component Value Date     (H) 05/21/2024        Cardiovascular Disease Screening    Lipid Panel  Cholesterol  Lipoprotein (HDL)  Triglycerides Covered every 5 years for all Medicare beneficiaries without apparent signs or symptoms of cardiovascular disease Lab Results   Component Value Date    CHOLEST 287 (H) 05/21/2024    HDL 62 (H) 05/21/2024     (H) 05/21/2024    TRIG 116 05/21/2024         Electrocardiogram (EKG)   Covered if needed at Welcome to Medicare, and non-screening if indicated for medical reasons -      Ultrasound Screening for Abdominal Aortic Aneurysm (AAA) Covered once in a lifetime for one of the following risk factors    Men who are 65-75 years old and have ever smoked    Anyone with a family history -      Colorectal Cancer Screening  Covered for ages 50-85; only need ONE of the following:    Colonoscopy   Covered every 10 years    Covered every 2 years if patient is at high risk or previous colonoscopy was abnormal -    Health Maintenance   Topic Date Due    Colorectal Cancer Screening  Never done       Flexible Sigmoidoscopy   Covered every 4 years -    Fecal Occult Blood Test Covered annually -   Bone Density Screening    Bone density screening    Covered every 2 years after age 65 if diagnosed with risk of osteoporosis or estrogen deficiency.    Covered yearly for long-term glucocorticoid medication use (Steroids) Last Dexa Scan:    XR DEXA BONE DENSITOMETRY (CPT=77080) 04/03/2024      No recommendations at this time   Pap and Pelvic    Pap   Covered every 2 years for women at normal risk; Annually if at high risk -  No recommendations at this time    Chlamydia Annually if high risk -  No recommendations at this time   Screening Mammogram    Mammogram     Recommend annually for all female patients aged 40 and older    One baseline mammogram covered for patients aged 35-39 -    No recommendations at this time    Immunizations    Influenza Covered once per flu season  Please get every year 11/07/2023  No recommendations at this time    Pneumococcal Each vaccine ([id number] & [id number]) covered once after 65 Prevnar 13: 08/31/2018    [id number]: -     No recommendations at this time    Hepatitis B One screening covered for patients with certain risk factors   -  No recommendations at this time    Tetanus Toxoid Not covered by Medicare Part B unless medically necessary (cut with metal); may be covered with your pharmacy prescription benefits -    Tetanus, Diptheria and Pertusis TD and TDaP Not covered by Medicare Part B -  No recommendations at this time    Zoster Not covered by Medicare Part B; may be covered with your pharmacy  prescription benefits 12/21/2015  Zoster Vaccines(2 of 3) due on 02/15/2016

## 2024-06-20 NOTE — PATIENT INSTRUCTIONS
Exercise for a Healthier Heart     Exercise with a friend. When activity is fun, you're more likely to stick with it.   You may wonder how you can improve the health of your heart. If you’re thinking about exercise, you’re on the right track. You don’t need to become an athlete, but you do need a certain amount of brisk exercise to help strengthen your heart. If you have been diagnosed with a heart condition, your doctor may recommend exercise to help stabilize your condition. To help make exercise a habit, choose safe, fun activities.  Be sure to check with your healthcare provider before starting an exercise program.  Why exercise?  Exercising regularly offers many healthy rewards. It can help you do all of the following:  Improve your blood cholesterol level to help prevent further heart trouble  Lower your blood pressure to help prevent a stroke or heart attack  Control diabetes, or reduce your risk of getting this disease  Improve your heart and lung function  Reach and maintain a healthy weight  Make your muscles stronger and more limber so you can stay active  Prevent falls and fractures by slowing the loss of bone mass (osteoporosis)  Manage stress better  Reduce your blood pressure  Improve your sense of self and your body image  Exercise tips  Ease into your routine. Set small goals. Then build on them.  Exercise on most days. Aim for a total of 150 or more minutes of moderate to  vigorous intensity activity each week. Consider 40 minutes, 3 to 4 times a week. For best results, activity should last for 40 minutes on average. It is OK to work up to the 40 minute period over time. Examples of moderate-intensity activity is walking 1 mile in 15 minutes or 30 to 45 minutes of yard work.  Step up your daily activity level. Along with your exercise program, try being more active throughout the day. Walk instead of drive. Do more household tasks or yard work.  Choose one or more activities you enjoy. Walking is  one of the easiest things you can do. You can also try swimming, riding a bike, dancing, or taking an exercise class.  Stop exercising and call your doctor if you:  Have chest pain or feel dizzy or lightheaded  Feel burning, tightness, pressure, or heaviness in your chest, neck, shoulders, back, or arms  Have unusual shortness of breath  Have increased joint or muscle pain  Have palpitations or an irregular heartbeat  Date Last Reviewed: 5/1/2016  © 9205-4476 [employer]. [address] [place] [address]. All rights reserved. This information is not intended as a substitute for professional medical care. Always follow your healthcare professional's instructions.           4 Steps for Eating Healthier  Changing the way you eat can improve your health. It can lower your cholesterol and blood pressure, and help you stay at a healthy weight. Your diet doesn’t have to be bland and boring to be healthy. Just watch your calories and follow these steps:    Step 1. Eat fewer unhealthy fats  Choose more fish and lean meats instead of fatty cuts of meat.  Skip butter and lard, and use less margarine.  Pass on foods that have palm, coconut, or hydrogenated oils.  Eat fewer high-fat dairy foods like cheese, ice cream, and whole milk.  Get a heart-healthy cookbook and try some low-fat recipes.     Step 2. Go light on salt  Keep the saltshaker off the table.  Limit high-salt ingredients, such as soy sauce, bouillon, and garlic salt.  Instead of adding salt when cooking, season your food with herbs and flavorings. Try lemon, garlic, and onion, or salt-free herb seasonings.  Limit convenience foods, such as boxed or canned foods and restaurant food.  Read food labels and choose lower-sodium options.     Step 3. Limit sugar  Pause before you add sugars to pancakes, cereal, coffee, or tea. This includes white and brown table sugar, syrup, honey, and molasses. Cut your usual amount by half.  Use non-sugar  sweeteners. Stevia, aspartame, and sucralose can satisfy a sweet tooth without adding calories.  Swap out sugar-filled soda and other drinks. Buy sugar-free or low-calorie beverages. Remember water is always the best choice.  Read labels and choose foods with less added sugar. Keep in mind that dairy foods and foods with fruit will have some natural sugar.  Cut the sugar in recipes by 1/3 to 1/2. Boost the flavor with extracts like almond, vanilla, or orange. Or add spices such as cinnamon or nutmeg.     Step 4. Eat more fiber  Eat fresh fruits and vegetables every day.  Boost your diet with whole grains. Go for oats, whole-grain rice, and bran.  Add beans and lentils to your meals.  Drink more water to match your fiber increase to help prevent constipation.     Date Last Reviewed: 6/1/2017  © 4745-9302 [employer]. [address] [place] [address], [place] [address]. All rights reserved. This information is not intended as a substitute for professional medical care. Always follow your healthcare professional's instructions.

## 2024-07-02 DIAGNOSIS — G43.709 CHRONIC MIGRAINE WITHOUT AURA WITHOUT STATUS MIGRAINOSUS, NOT INTRACTABLE: ICD-10-CM

## 2024-07-02 RX ORDER — RIZATRIPTAN BENZOATE 10 MG/1
10 TABLET ORAL AS NEEDED
Qty: 12 TABLET | Refills: 1 | Status: SHIPPED | OUTPATIENT
Start: 2024-07-02

## 2024-07-02 RX ORDER — BUSPIRONE HYDROCHLORIDE 30 MG/1
30 TABLET ORAL DAILY
Qty: 90 TABLET | Refills: 1 | Status: SHIPPED | OUTPATIENT
Start: 2024-07-02

## 2024-07-02 NOTE — TELEPHONE ENCOUNTER
Buspirone last refilled 3/29/24  No future appointment  LOV with  6/20/24  No protocol on medication  Routed to advise refill

## 2024-07-02 NOTE — TELEPHONE ENCOUNTER
Rizatriptan last refilled 3/18/24  No future appointment  LOV with  6/20/24  Routed to advise refill    Neurology Medications Qfmxkt3007/02/2024 10:04 AM   Protocol Details In person appointment or virtual visit in the past 6 mos or appointment in next 3 mos

## 2024-07-08 NOTE — TELEPHONE ENCOUNTER
Hypertension Medications Protocol Smahvq7407/06/2024 07:39 PM   Protocol Details CMP or BMP in past 12 months    Last BP reading less than 140/90    In person appointment or virtual visit in the past 12 mos or appointment in next 3 mos    EGFRCR or GFRNAA > 50      Request for  PROPRANOLOL 40 MG Oral Tab     LOV 6/20/24 with    Last refill 12/19/23   Future Appointments   Date Time Provider Department Center   2/10/2025 11:00 AM Shantelle Hankins DO [id number] [facility]   Labs 5/21/24

## 2024-07-09 RX ORDER — PROPRANOLOL HYDROCHLORIDE 40 MG/1
40 TABLET ORAL 2 TIMES DAILY
Qty: 60 TABLET | Refills: 0 | Status: CANCELLED | OUTPATIENT
Start: 2024-07-09 | End: 2024-08-08

## 2024-07-11 RX ORDER — PROPRANOLOL HYDROCHLORIDE 40 MG/1
40 TABLET ORAL 2 TIMES DAILY
Qty: 180 TABLET | Refills: 1 | Status: SHIPPED | OUTPATIENT
Start: 2024-07-11

## 2024-07-25 ENCOUNTER — TELEPHONE (OUTPATIENT)
Dept: FAMILY MEDICINE CLINIC | Facility: CLINIC | Age: 74
End: 2024-07-25

## 2024-07-25 NOTE — TELEPHONE ENCOUNTER
Imaging due  [employer] message sent  Future Appointments   Date Time Provider Department Center   2/10/2025 11:00 AM Shantelle Hankins DO [id number] [facility]

## 2024-08-19 ENCOUNTER — TELEPHONE (OUTPATIENT)
Dept: FAMILY MEDICINE CLINIC | Facility: CLINIC | Age: 74
End: 2024-08-19

## 2024-08-19 ENCOUNTER — LAB ENCOUNTER (OUTPATIENT)
Dept: LAB | Age: 74
End: 2024-08-19
Attending: FAMILY MEDICINE
Payer: MEDICARE

## 2024-08-19 DIAGNOSIS — E03.9 HYPOTHYROIDISM, UNSPECIFIED TYPE: ICD-10-CM

## 2024-08-19 DIAGNOSIS — E78.5 HYPERLIPIDEMIA, UNSPECIFIED HYPERLIPIDEMIA TYPE: ICD-10-CM

## 2024-08-19 DIAGNOSIS — E78.5 HYPERLIPIDEMIA, UNSPECIFIED HYPERLIPIDEMIA TYPE: Primary | ICD-10-CM

## 2024-08-19 LAB
ALBUMIN SERPL-MCNC: 4.5 G/DL (ref 3.2–4.8)
ALBUMIN/GLOB SERPL: 1.6 {RATIO} (ref 1–2)
ALP LIVER SERPL-CCNC: 72 U/L
ALT SERPL-CCNC: 25 U/L
ANION GAP SERPL CALC-SCNC: 6 MMOL/L (ref 0–18)
AST SERPL-CCNC: 26 U/L (ref ?–34)
BILIRUB SERPL-MCNC: 0.9 MG/DL (ref 0.2–1.1)
BUN BLD-MCNC: 20 MG/DL (ref 9–23)
CALCIUM BLD-MCNC: 9.8 MG/DL (ref 8.7–10.4)
CHLORIDE SERPL-SCNC: 107 MMOL/L (ref 98–112)
CHOLEST SERPL-MCNC: 170 MG/DL (ref ?–200)
CO2 SERPL-SCNC: 27 MMOL/L (ref 21–32)
CREAT BLD-MCNC: 1.04 MG/DL
EGFRCR SERPLBLD CKD-EPI 2021: 56 ML/MIN/1.73M2 (ref 60–?)
FASTING PATIENT LIPID ANSWER: YES
FASTING STATUS PATIENT QL REPORTED: YES
GLOBULIN PLAS-MCNC: 2.8 G/DL (ref 2–3.5)
GLUCOSE BLD-MCNC: 112 MG/DL (ref 70–99)
HDLC SERPL-MCNC: 61 MG/DL (ref 40–59)
LDLC SERPL CALC-MCNC: 90 MG/DL (ref ?–100)
NONHDLC SERPL-MCNC: 109 MG/DL (ref ?–130)
OSMOLALITY SERPL CALC.SUM OF ELEC: 293 MOSM/KG (ref 275–295)
POTASSIUM SERPL-SCNC: 4.5 MMOL/L (ref 3.5–5.1)
PROT SERPL-MCNC: 7.3 G/DL (ref 5.7–8.2)
SODIUM SERPL-SCNC: 140 MMOL/L (ref 136–145)
TRIGL SERPL-MCNC: 105 MG/DL (ref 30–149)
TSI SER-ACNC: 2.33 MIU/ML (ref 0.55–4.78)
VLDLC SERPL CALC-MCNC: 17 MG/DL (ref 0–30)

## 2024-08-19 PROCEDURE — 80053 COMPREHEN METABOLIC PANEL: CPT

## 2024-08-19 PROCEDURE — 80061 LIPID PANEL: CPT

## 2024-08-19 PROCEDURE — 36415 COLL VENOUS BLD VENIPUNCTURE: CPT

## 2024-08-19 PROCEDURE — 84443 ASSAY THYROID STIM HORMONE: CPT

## 2024-08-19 NOTE — TELEPHONE ENCOUNTER
----- Message from Tara Rosario sent at 8/19/2024  2:15 PM CDT -----  Results reviewed.   Cholesterol improved from previous. Continue Crestor  Chemistries stable, recommend to push fluids  Thyroid function normal    Recheck same labs in 6 months

## 2024-08-21 NOTE — TELEPHONE ENCOUNTER
Medication: TIZANIDINE 4 MG      Date of last refill: 4/15/24 (#30/2R)  [place]24     Last office visit: 12/28/23 Botox, 10/16/23 Appt  Due back to clinic per last office note:  per Botox  Date next office visit scheduled:    Future Appointments   Date Time Provider Department Center   2/10/2025 11:00 AM Shantelle Hankins DO [id number] [facility]           Last OV note recommendation:    Dated 10/16/23..    Problem/s Identified this visit:   Chronic migraine without aura without status migrainosus, not intractable  (primary encounter diagnosis)  Cervical radiculopathy due to osteoarthritis of spine  Cervical dystonia  Memory difficulties        Discussion plus Diagnostics & Treatment Orders:     Chronic Migraines- Has reduced the use of Excedrin but is still using it daily. Explained to patient again that she needs to discontinue the Excedrin as it can cause analgesic overuse headaches. Increase the tizanidine to 4mg nightly for headache prevention     Cervical Radiculopathy- Recommended pain consult. Patient given Dr. Franco's information     Cervical Dystonia- Increase the tizanidine to 4mg nightly. Completed first round of Botox and has not yet seen a benefit. Recommended proceeding with second round of botox.     Memory Difficulties- Vitamin B12 was normal. Discussed with patient and daughter if after her thyroid stabilizes  there are continued concerns regarding her memory we can refer her for neuropsychological testing. They will call if they want to proceed with the referral. They expressed full understanding

## 2024-09-09 DIAGNOSIS — E78.5 HYPERLIPIDEMIA, UNSPECIFIED HYPERLIPIDEMIA TYPE: ICD-10-CM

## 2024-09-09 RX ORDER — ROSUVASTATIN CALCIUM 5 MG/1
5 TABLET, COATED ORAL NIGHTLY
Qty: 90 TABLET | Refills: 0 | Status: SHIPPED | OUTPATIENT
Start: 2024-09-09

## 2024-09-09 NOTE — TELEPHONE ENCOUNTER
Cholesterol Medication Protocol [id number]/09/2024 08:20 AM   Protocol Details ALT < 80    ALT resulted within past year    Lipid panel within past 12 months    In person appointment or virtual visit in the past 12 mos or appointment in next 3 mos        Request for rosuvastatin 5 MG Oral Tab     LOV 6/20/24 with    Last refill 6/20/24 - 90 tablets o refill   Future Appointments   Date Time Provider Department Center   2/10/2025 11:00 AM Shantelle Hankins DO [id number] [facility]   Labs 8/19/24

## 2024-09-13 RX ORDER — LEVOTHYROXINE SODIUM 50 UG/1
TABLET ORAL
Qty: 135 TABLET | Refills: 0 | Status: SHIPPED | OUTPATIENT
Start: 2024-09-13

## 2024-09-24 DIAGNOSIS — E78.5 HYPERLIPIDEMIA, UNSPECIFIED HYPERLIPIDEMIA TYPE: ICD-10-CM

## 2024-09-24 RX ORDER — ROSUVASTATIN CALCIUM 5 MG/1
5 TABLET, COATED ORAL NIGHTLY
Qty: 90 TABLET | Refills: 0 | OUTPATIENT
Start: 2024-09-24

## 2024-09-26 DIAGNOSIS — G43.709 CHRONIC MIGRAINE WITHOUT AURA WITHOUT STATUS MIGRAINOSUS, NOT INTRACTABLE: ICD-10-CM

## 2024-09-26 RX ORDER — RIZATRIPTAN BENZOATE 10 MG/1
10 TABLET ORAL AS NEEDED
Qty: 12 TABLET | Refills: 0 | Status: SHIPPED | OUTPATIENT
Start: 2024-09-26

## 2024-09-26 NOTE — TELEPHONE ENCOUNTER
Rizatriptan last refilled 7/2/24  No future appointment in office  LOV with Ana 6/20/24      Neurology Medications [id number]/26/2024 08:06 AM   Protocol Details In person appointment or virtual visit in the past 6 mos or appointment in next 3 mos

## 2024-11-17 DIAGNOSIS — G43.709 CHRONIC MIGRAINE WITHOUT AURA WITHOUT STATUS MIGRAINOSUS, NOT INTRACTABLE: ICD-10-CM

## 2024-11-18 RX ORDER — RIZATRIPTAN BENZOATE 10 MG/1
10 TABLET ORAL AS NEEDED
Qty: 12 TABLET | Refills: 1 | Status: SHIPPED | OUTPATIENT
Start: 2024-11-18

## 2024-11-18 NOTE — TELEPHONE ENCOUNTER
Last office visit 6/20/24  Last refilled on 9/26/24 for # 12 with 0 refills  Future Appointments   Date Time Provider Department Center   2/10/2025 11:00 AM Shantelle Hankins DO [id number] [facility]        Thank you.

## 2024-11-30 DIAGNOSIS — E78.5 HYPERLIPIDEMIA, UNSPECIFIED HYPERLIPIDEMIA TYPE: ICD-10-CM

## 2024-12-02 RX ORDER — ROSUVASTATIN CALCIUM 5 MG/1
5 TABLET, COATED ORAL NIGHTLY
Qty: 90 TABLET | Refills: 0 | Status: SHIPPED | OUTPATIENT
Start: 2024-12-02

## 2024-12-02 NOTE — TELEPHONE ENCOUNTER
Medication: TIZANIDINE 4 MG      Date of last refill: 08/21/2024(#30/2R)  [place]      Last office visit: 12/28/23 Botox, 10/16/23 Appt  Due back to clinic per last office note:  per Botox  Date next office visit scheduled:           Future Appointments   Date Time Provider Department Center   2/10/2025 11:00 AM Shantelle Hankins DO [id number] [facility]            Last OV note recommendation:     Dated 10/16/23..     Problem/s Identified this visit:   Chronic migraine without aura without status migrainosus, not intractable  (primary encounter diagnosis)  Cervical radiculopathy due to osteoarthritis of spine  Cervical dystonia  Memory difficulties        Discussion plus Diagnostics & Treatment Orders:     Chronic Migraines- Has reduced the use of Excedrin but is still using it daily. Explained to patient again that she needs to discontinue the Excedrin as it can cause analgesic overuse headaches. Increase the tizanidine to 4mg nightly for headache prevention     Cervical Radiculopathy- Recommended pain consult. Patient given Dr. Franco's information     Cervical Dystonia- Increase the tizanidine to 4mg nightly. Completed first round of Botox and has not yet seen a benefit. Recommended proceeding with second round of botox.     Memory Difficulties- Vitamin B12 was normal. Discussed with patient and daughter if after her thyroid stabilizes  there are continued concerns regarding her memory we can refer her for neuropsychological testing. They will call if they want to proceed with the referral. They expressed full understanding

## 2024-12-02 NOTE — TELEPHONE ENCOUNTER
Cholesterol Medication Protocol [id number]/30/2024 07:56 AM   Protocol Details ALT < 80    ALT resulted within past year    Lipid panel within past 12 months    In person appointment or virtual visit in the past 12 mos or appointment in next 3 mos     Request for ROSUVASTATIN 5 MG Oral Tab     LOV 6/20/24 with    Last refill 9/9/24 -90 tablets 0 refill   Future Appointments   Date Time Provider Department Center   1/21/2025 11:00 AM Andrew Isaacs MD ENIWARREN [facility]   2/10/2025 11:00 AM Shantelle Hankins DO [id number] [facility]   Labs 8/19/24

## 2024-12-09 ENCOUNTER — TELEMEDICINE (OUTPATIENT)
Dept: FAMILY MEDICINE CLINIC | Facility: CLINIC | Age: 74
End: 2024-12-09
Payer: MEDICARE

## 2024-12-09 DIAGNOSIS — U07.1 COVID-19: Primary | ICD-10-CM

## 2024-12-09 NOTE — PROGRESS NOTES
Virtual/Telephone Check-In    Roseanna Simon  verbally consents to a Virtual/Telephone Check-In service on 12/9/2024 .  Patient understands and accepts financial responsibility for any deductible, co-insurance and/or co-pays associated with this service.    This was a video visit with audio and visual connection via Internet connection call with the patient    Problem List Items Addressed This Visit    None  Visit Diagnoses       COVID-19    -  Primary    Relevant Medications    nirmatrelvir-ritonavir 300-100 MG Oral Tablet Therapy Pack           Chief Complaint   Patient presents with    Covid       Medications allergies and chart reviewed  COVID-19 protocol    HPI:   Roseanna Simon is a 74 year old female who schedules a virtual visit with complaints of COVID. Began to have symptoms Saturday night. No known COVID exposure. Has not had COVID previously.    Allergies:  Allergies[1]    Current Outpatient Medications   Medication Sig Dispense Refill    nirmatrelvir-ritonavir 300-100 MG Oral Tablet Therapy Pack Take one nirmatrelvir tablet (150mg) with one ritonavir tablet (100mg) together twice daily for 5 days.   (Nirmatrelvir dose is renally adjusted) 30 tablet 0    ROSUVASTATIN 5 MG Oral Tab Take 1 tablet by mouth nightly. 90 tablet 0    TIZANIDINE 4 MG Oral Tab TAKE 1 TABLET BY MOUTH NIGHTLY 30 tablet 0    Rizatriptan Benzoate 10 MG Oral Tab Take 1 tablet (10 mg total) by mouth as needed for Migraine. 12 tablet 1    levothyroxine 50 MCG Oral Tab TAKE 1 TABLET BY MOUTH ON TUESDAY, THURSDAY, and SATURDAY. TAKE ONE AND A HALF TABLETS ON MONDAYS, WEDNESDAYS, FRIDAYS, and SUNDAYS 135 tablet 0    PROPRANOLOL 40 MG Oral Tab TAKE ONE TABLET BY MOUTH TWICE DAILY 180 tablet 1    busPIRone HCl 30 MG Oral Tab TAKE ONE TABLET BY MOUTH DAILY 90 tablet 1    Ergocalciferol (VITAMIN D OR) Take 5,000 Units by mouth daily. Liquid form      levothyroxine 75 MCG Oral Tab Take 75mcg Monday Wednesday Friday Sunday      PREVIDENT  5000 DRY MOUTH 1.1 % Dental Gel Take 1 Bottle by mouth As Directed.      fluticasone propionate 50 MCG/ACT Nasal Suspension 2 sprays by Each Nare route daily. 3 each 0    Cyanocobalamin (VITAMIN B 12 OR) Take by mouth.      famoTIDine 20 MG Oral Tab Take 1 tablet (20 mg total) by mouth 2 (two) times daily.      Multiple Vitamin (MULTI-VITAMIN DAILY) Oral Tab Take by mouth.        Past Medical History:    Anxiety    Esophageal reflux    History of kidney stones    Hypothyroidism    Migraine    stable for years    Osteoporosis    t score -4.2 2020      Past Surgical History:   Procedure Laterality Date    Elbow fracture surgery Left     Other surgical history  years ago    L wrist dquervains and L elbow surgery      Family History   Problem Relation Age of Onset    Other (Other) Mother         osteoporosis, has one kidney (not sure why)    Migraines Sister     Other (Other) Sister         \"orthostatic tremors\"    Other (Other) Daughter         migraine, fibromyalgia      Social History     Socioeconomic History    Marital status:    Tobacco Use    Smoking status: Former     Current packs/day: 0.00     Types: Cigarettes     Quit date:      Years since quittin.9     Passive exposure: Never    Smokeless tobacco: Never   Vaping Use    Vaping status: Never Used   Substance and Sexual Activity    Alcohol use: Yes     Comment: 1 glass wine/night    Drug use: Never   Other Topics Concern    Caffeine Concern Yes     Comment: 1 cup a day    Exercise Yes     Comment: active household     Social Drivers of Health      [facility]    Social Connections    [facility]    Housing Stability         REVIEW OF SYSTEMS:   GENERAL: +chills, body aches  HEENT: + nasal congestion, PND, sore throat  LUNGS: denies cough, SOB,  wheezing  CARDIOVASCULAR: denies chest pain, palpitations  GI: denies  abdominal pain, nausea, vomiting, change in bowel movements  NEURO: + headaches  EXAM:   VITALS: not available as this is a telemed visit    GENERAL: Does not appear to be in acute distress, appears ill  LUNG: No dyspnea with conversation  rest of physical exam unable to perform as this is a telemed visit    ASSESSMENT AND PLAN:     Encounter Diagnosis   Name Primary?    COVID-19 Yes     Problem List Items Addressed This Visit    None  Visit Diagnoses       COVID-19    -  Primary    Relevant Medications    nirmatrelvir-ritonavir 300-100 MG Oral Tablet Therapy Pack          Supportive care discussed  Start Paxlovid  Hold Crestor for 10 days, no Buspar while on antiviral  The patient indicates understanding of these issues and agrees to the plan.  The patient is asked to return if sx's persist or worsen.    Patient had an opportunity to ask questions and they were answered to her satisfaction.    \"Please note that this visit was completed using two-way, real-time interactive audio and/or video communication.  This has been done in good bekah to provide continuity of care in the best interest of the provider-patient relationship, due to the ongoing public health crisis/national emergency and because of restrictions of visitation.  There are limitations of this visit as no physical exam could be performed.  Every conscious effort was taken to allow for sufficient and adequate time.  This billing was spent on reviewing labs, medications, radiology tests and decision making.  Appropriate medical decision-making and tests are ordered as detailed in the plan of care above.\"      Tara POLLOCK         [1]   Allergies  Allergen Reactions    Latex RASH    Penicillins SWELLING    Clindamycin NAUSEA AND VOMITING    Thimerosal OTHER (SEE COMMENTS)     Lost sight

## 2024-12-10 RX ORDER — LEVOTHYROXINE SODIUM 50 UG/1
TABLET ORAL
Qty: 115 TABLET | Refills: 0 | Status: SHIPPED | OUTPATIENT
Start: 2024-12-10

## 2024-12-10 NOTE — TELEPHONE ENCOUNTER
Thyroid Medication Protocol Kyowkx48/10/2024 01:30 AM   Protocol Details TSH in past 12 months    Last TSH value is normal    In person appointment or virtual visit in the past 12 mos or appointment in next 3 mos     Request for LEVOTHYROXINE 50 MCG Oral Tab     LOV 6/20/24 with    Last refill  Future Appointments   Date Time Provider Department Center   1/21/2025 11:00 AM Andrew Isaacs MD ENI[facility]ARR[facility]   2/10/2025 11:00 AM Shantelle Hankins DO [id number] [facility]   Labs 8/19/24 -2.325

## 2024-12-16 ENCOUNTER — TELEPHONE (OUTPATIENT)
Dept: FAMILY MEDICINE CLINIC | Facility: CLINIC | Age: 74
End: 2024-12-16

## 2024-12-16 NOTE — TELEPHONE ENCOUNTER
Call from pharmacy  States that patient did not  paxlovid script from 12/9/24  Advised ok to cancel order

## 2024-12-18 NOTE — TELEPHONE ENCOUNTER
12/17/2024 10:51 AM Y Buffy Jackson RN Patient Medical Advice Request  covid     Patient viewed [employer] message

## 2025-01-06 NOTE — TELEPHONE ENCOUNTER
Hypertension Medications Protocol Passed      Last refill 24 180 1 refill    New Warnings (1 unfiltered, 3 filtered)[]Show filtered (3)  High  Drug-Drug: tiZANidine and propranololPlasma concentrations and pharmacologic effects of tizanidine may be increased by weak CY inhibitors (eg, Weak CY Inhibitors).  Details  Override reason        PROPRANOLOL 40 MG Oral Tab [Pharmacy Med Name: Propranolol Hydrochloride 40 Mg Tab Amne]  Prescription. Reordered.  TIZANIDINE 4 MG Oral TabPrescription. Active.

## 2025-01-08 RX ORDER — PROPRANOLOL HYDROCHLORIDE 40 MG/1
40 TABLET ORAL 2 TIMES DAILY
Qty: 180 TABLET | Refills: 0 | Status: SHIPPED | OUTPATIENT
Start: 2025-01-08

## 2025-02-01 ENCOUNTER — NURSE ONLY (OUTPATIENT)
Dept: FAMILY MEDICINE CLINIC | Facility: CLINIC | Age: 75
End: 2025-02-01
Payer: MEDICARE

## 2025-02-01 DIAGNOSIS — E78.5 HYPERLIPIDEMIA, UNSPECIFIED HYPERLIPIDEMIA TYPE: ICD-10-CM

## 2025-02-01 DIAGNOSIS — M81.0 AGE-RELATED OSTEOPOROSIS WITHOUT CURRENT PATHOLOGICAL FRACTURE: ICD-10-CM

## 2025-02-01 DIAGNOSIS — E03.9 HYPOTHYROIDISM, UNSPECIFIED TYPE: ICD-10-CM

## 2025-02-01 LAB
ALBUMIN SERPL-MCNC: 4.4 G/DL (ref 3.2–4.8)
ALBUMIN/GLOB SERPL: 1.8 {RATIO} (ref 1–2)
ALP LIVER SERPL-CCNC: 67 U/L
ALT SERPL-CCNC: 21 U/L
ANION GAP SERPL CALC-SCNC: 8 MMOL/L (ref 0–18)
AST SERPL-CCNC: 28 U/L (ref ?–34)
BILIRUB SERPL-MCNC: 0.7 MG/DL (ref 0.2–1.1)
BUN BLD-MCNC: 20 MG/DL (ref 9–23)
CALCIUM BLD-MCNC: 9.5 MG/DL (ref 8.7–10.6)
CHLORIDE SERPL-SCNC: 105 MMOL/L (ref 98–112)
CHOLEST SERPL-MCNC: 178 MG/DL (ref ?–200)
CO2 SERPL-SCNC: 28 MMOL/L (ref 21–32)
CREAT BLD-MCNC: 1.01 MG/DL
EGFRCR SERPLBLD CKD-EPI 2021: 58 ML/MIN/1.73M2 (ref 60–?)
FASTING PATIENT LIPID ANSWER: YES
FASTING STATUS PATIENT QL REPORTED: YES
GLOBULIN PLAS-MCNC: 2.5 G/DL (ref 2–3.5)
GLUCOSE BLD-MCNC: 96 MG/DL (ref 70–99)
HDLC SERPL-MCNC: 58 MG/DL (ref 40–59)
LDLC SERPL CALC-MCNC: 102 MG/DL (ref ?–100)
NONHDLC SERPL-MCNC: 120 MG/DL (ref ?–130)
OSMOLALITY SERPL CALC.SUM OF ELEC: 294 MOSM/KG (ref 275–295)
PHOSPHATE SERPL-MCNC: 4.4 MG/DL (ref 2.4–5.1)
POTASSIUM SERPL-SCNC: 4.6 MMOL/L (ref 3.5–5.1)
PROT SERPL-MCNC: 6.9 G/DL (ref 5.7–8.2)
PTH-INTACT SERPL-MCNC: 49.7 PG/ML (ref 18.5–88)
SODIUM SERPL-SCNC: 141 MMOL/L (ref 136–145)
TRIGL SERPL-MCNC: 100 MG/DL (ref 30–149)
TSI SER-ACNC: 2.21 UIU/ML (ref 0.55–4.78)
VIT D+METAB SERPL-MCNC: 42.5 NG/ML (ref 30–100)
VLDLC SERPL CALC-MCNC: 17 MG/DL (ref 0–30)

## 2025-02-01 PROCEDURE — 82306 VITAMIN D 25 HYDROXY: CPT | Performed by: STUDENT IN AN ORGANIZED HEALTH CARE EDUCATION/TRAINING PROGRAM

## 2025-02-01 PROCEDURE — 83970 ASSAY OF PARATHORMONE: CPT | Performed by: STUDENT IN AN ORGANIZED HEALTH CARE EDUCATION/TRAINING PROGRAM

## 2025-02-01 PROCEDURE — 84443 ASSAY THYROID STIM HORMONE: CPT | Performed by: NURSE PRACTITIONER

## 2025-02-01 PROCEDURE — 84080 ASSAY ALKALINE PHOSPHATASES: CPT | Performed by: STUDENT IN AN ORGANIZED HEALTH CARE EDUCATION/TRAINING PROGRAM

## 2025-02-01 PROCEDURE — 80053 COMPREHEN METABOLIC PANEL: CPT | Performed by: NURSE PRACTITIONER

## 2025-02-01 PROCEDURE — 80061 LIPID PANEL: CPT | Performed by: NURSE PRACTITIONER

## 2025-02-01 PROCEDURE — 82523 COLLAGEN CROSSLINKS: CPT | Performed by: STUDENT IN AN ORGANIZED HEALTH CARE EDUCATION/TRAINING PROGRAM

## 2025-02-01 PROCEDURE — 84100 ASSAY OF PHOSPHORUS: CPT | Performed by: NURSE PRACTITIONER

## 2025-02-03 ENCOUNTER — TELEPHONE (OUTPATIENT)
Dept: FAMILY MEDICINE CLINIC | Facility: CLINIC | Age: 75
End: 2025-02-03

## 2025-02-03 NOTE — TELEPHONE ENCOUNTER
----- Message from Tara Rosario sent at 2/3/2025  9:45 AM CST -----  Results reviewed.  Chemistries stable  LDL mildly increased from previous but overall stable. Continue current dose of statin  Thyroid function stable

## 2025-02-05 LAB — ALKALINE PHOSPHATASE BONE SPECIFIC: 7.5 UG/L

## 2025-02-07 LAB — C-TELOPEPTIDE: 164 PG/ML

## 2025-02-10 ENCOUNTER — OFFICE VISIT (OUTPATIENT)
Facility: CLINIC | Age: 75
End: 2025-02-10
Payer: MEDICARE

## 2025-02-10 VITALS
OXYGEN SATURATION: 96 % | SYSTOLIC BLOOD PRESSURE: 124 MMHG | BODY MASS INDEX: 27.58 KG/M2 | HEART RATE: 73 BPM | DIASTOLIC BLOOD PRESSURE: 78 MMHG | WEIGHT: 148 LBS | HEIGHT: 61.5 IN

## 2025-02-10 DIAGNOSIS — M81.0 AGE-RELATED OSTEOPOROSIS WITHOUT CURRENT PATHOLOGICAL FRACTURE: Primary | ICD-10-CM

## 2025-02-10 DIAGNOSIS — E03.9 ACQUIRED HYPOTHYROIDISM: ICD-10-CM

## 2025-02-10 PROCEDURE — 99214 OFFICE O/P EST MOD 30 MIN: CPT | Performed by: STUDENT IN AN ORGANIZED HEALTH CARE EDUCATION/TRAINING PROGRAM

## 2025-02-10 RX ORDER — LEVOTHYROXINE SODIUM 50 UG/1
TABLET ORAL
Qty: 115 TABLET | Refills: 0 | Status: SHIPPED | OUTPATIENT
Start: 2025-02-10

## 2025-02-10 RX ORDER — ALENDRONATE SODIUM 70 MG/1
70 TABLET ORAL
Qty: 12 TABLET | Refills: 0 | Status: SHIPPED | OUTPATIENT
Start: 2025-02-10 | End: 2025-05-11

## 2025-02-10 NOTE — PROGRESS NOTES
ENDOCRINOLOGY, DIABETES & METABOLISM CONSULT NOTE   Initial Consult Date: 05/14/24  Name: Roseanna Simon      Subjective:    HISTORY OF PRESENT ILLNESS:   Roseanna Simon is a 74 year old female seen in consultation for her osteoporosis    Patient presents to the clinic for an initial bone health evaluation due to a history of DEXA confirmed osteoporosis since 2020.     OSTEOPOROSIS RISK FACTORS ASSESSMENT  Postmenopausal Yes, around late 50's    Maternal hx of osteoporosis or hx of parental hip fx:  Yes, mother with osteoporosis   Recent Fracture: No   Previous fracture after the age of 50:  No   Hx of kidney stones Yes, 1 episode around her late 30s   Frequent falls Yes, per daughter had a few falls last year from tripping/getting her foot caught. Daughter also notes she has an unsteady gait   Poor vision Nohx of cataract surgery   New or Worsening Back Pain NoIf sitting for a long time notes low back pain after repositioning    History of Vit D insufficiency:   Current Vitamin D supplementation: No  Daily vitamin D 1000 units daily (liquid)   Poor intake of calcium  Daily calcium intake: Yes, moderate intake with daily cheese and yogurt 3x/week  Daily calcium 90 mg    Caffeine intake Yes, 0.5-1 cup of coffee   Smoking No   Alcohol intake >3/d:  No   Hx of thyroid disease Yes, diagnosed with hypothyroidism around summer of 2023 when TSH was 23; started on LT4   Hx of calcium problems or hyperparathyroidism No   Previous treatment for osteoporosis Yes, started on Fosamax in 2020 with Dr. Mansfield   Malabsorption, chronic diarrhea, celiac sprue   No   History of RA  No   History of skeletal radiation No   History of eating disorder No     Intake of medications that cause bone loss:     Seizure medications: Topomax for HA for many years (10+)  PPI:  Pantoprazole for 15+ years      Treatment Contraindications:  Dysphagia: denies  Plans for dental procedures: saw dentist 4/2024 for cleaning    6/6/2024  Notes cold  intolerance, intermittent constipation  Denies falls or fractures since last visit  Saw dentist for cleaning      Interval Hx 02/10/25  Labs: 2/1/2025 , bone specific alk phos 7.5, vitamin D42.5, PTH 49.7, TSH 2.2, total calcium 9.5, Phos 4.4  No  fractures since LOV   Denies kidney stones  Continues on calcium and vitamin D supplements  Stopped Fosamax in January   Doess note some weight gain and fatigue        Allergies, PMH, SocHx and FHx reviewed and updated as appropriate in Epic on    levothyroxine 50 MCG Oral Tab TAKE 1 TABLET BY MOUTH ON TUESDAY, THURSDAY, and SATURDAY. TAKE ONE AND A HALF TABLETS ON MONDAYS, WEDNESDAYS, FRIDAYS, and SUNDAYS 115 tablet 0    alendronate 70 MG Oral Tab Take 1 tablet (70 mg total) by mouth every 7 days. 12 tablet 0    PROPRANOLOL 40 MG Oral Tab TAKE ONE TABLET BY MOUTH TWICE DAILY 180 tablet 0    ROSUVASTATIN 5 MG Oral Tab Take 1 tablet by mouth nightly. 90 tablet 0    TIZANIDINE 4 MG Oral Tab TAKE 1 TABLET BY MOUTH NIGHTLY 30 tablet 0    Rizatriptan Benzoate 10 MG Oral Tab Take 1 tablet (10 mg total) by mouth as needed for Migraine. 12 tablet 1    busPIRone HCl 30 MG Oral Tab TAKE ONE TABLET BY MOUTH DAILY 90 tablet 1    PREVIDENT 5000 DRY MOUTH 1.1 % Dental Gel Take 1 Bottle by mouth As Directed.      fluticasone propionate 50 MCG/ACT Nasal Suspension 2 sprays by Each Nare route daily. 3 each 0    Cyanocobalamin (VITAMIN B 12 OR) Take by mouth.      famoTIDine 20 MG Oral Tab Take 1 tablet (20 mg total) by mouth 2 (two) times daily.      Multiple Vitamin (MULTI-VITAMIN DAILY) Oral Tab Take by mouth.        onabotulinumtoxinA (Botox) injection 100 Units  100 Units Injection Once Andrew Isaacs MD         Allergies   Allergen Reactions    Latex RASH    Penicillins SWELLING    Clindamycin NAUSEA AND VOMITING    Thimerosal OTHER (SEE COMMENTS)     Lost sight      Current Outpatient Medications   Medication Sig Dispense Refill    levothyroxine 50 MCG Oral Tab TAKE 1  TABLET BY MOUTH ON TUESDAY, THURSDAY, and SATURDAY. TAKE ONE AND A HALF TABLETS ON , , , and SUNDAYS 115 tablet 0    alendronate 70 MG Oral Tab Take 1 tablet (70 mg total) by mouth every 7 days. 12 tablet 0    PROPRANOLOL 40 MG Oral Tab TAKE ONE TABLET BY MOUTH TWICE DAILY 180 tablet 0    ROSUVASTATIN 5 MG Oral Tab Take 1 tablet by mouth nightly. 90 tablet 0    TIZANIDINE 4 MG Oral Tab TAKE 1 TABLET BY MOUTH NIGHTLY 30 tablet 0    Rizatriptan Benzoate 10 MG Oral Tab Take 1 tablet (10 mg total) by mouth as needed for Migraine. 12 tablet 1    busPIRone HCl 30 MG Oral Tab TAKE ONE TABLET BY MOUTH DAILY 90 tablet 1    PREVIDENT 5000 DRY MOUTH 1.1 % Dental Gel Take 1 Bottle by mouth As Directed.      fluticasone propionate 50 MCG/ACT Nasal Suspension 2 sprays by Each Nare route daily. 3 each 0    Cyanocobalamin (VITAMIN B 12 OR) Take by mouth.      famoTIDine 20 MG Oral Tab Take 1 tablet (20 mg total) by mouth 2 (two) times daily.      Multiple Vitamin (MULTI-VITAMIN DAILY) Oral Tab Take by mouth.      Ergocalciferol (VITAMIN D OR) Take 5,000 Units by mouth daily. Liquid form (Patient not taking: Reported on 2/10/2025)       Past Medical History:    Anxiety    Esophageal reflux    History of kidney stones    Hypothyroidism    Migraine    stable for years    Osteoporosis    t score -4.2 2020     Past Surgical History:   Procedure Laterality Date    Elbow fracture surgery Left     Other surgical history  years ago    L wrist dquervains and L elbow surgery     Social History     Socioeconomic History    Marital status:    Tobacco Use    Smoking status: Former     Current packs/day: 0.00     Types: Cigarettes     Quit date:      Years since quittin.1     Passive exposure: Never    Smokeless tobacco: Never   Vaping Use    Vaping status: Never Used   Substance and Sexual Activity    Alcohol use: Yes     Comment: 1 glass wine/night    Drug use: Never   Other Topics Concern    Caffeine  Concern Yes     Comment: 1 cup a day    Exercise Yes     Comment: active household     Social Drivers of Health      [facility]    Housing Stability     Family History   Problem Relation Age of Onset    Other (Other) Mother         osteoporosis, has one kidney (not sure why)    Migraines Sister     Other (Other) Sister         \"orthostatic tremors\"    Other (Other) Daughter         migraine, fibromyalgia       REVIEW OF SYSTEMS: 10 point ROS completed, refer to HPI for pertinent positives    Objective:   PHYSICAL EXAMINATION:  Vital Signs:   Vitals:    02/10/25 1103   BP: 124/78   Pulse: 73       General Appearance:  Alert, in no acute distress, well developed  Eyes:  normal conjunctivae, sclera  Ears/Nose/Mouth/Throat/Neck:  normal hearing, normal speech and no thyromegaly  Respiratory:  breathing comfortably on room air, no accessory muscle usage  Cardiovascular:  regular rate and rhythm, no peripheral edema  Psychiatric:  Oriented to person, place and time, appropriate mood & affect  Skin: Normal moisture and skin texture  Neuro: sensory grossly intact, motor grossly intact. normal gait.    Recent Labs: Personally reviewed.   Interpretation: 4/2024 thyroid function within range, 6/13/2023 calcium 9.2, creatinine 1.09 with EGFR 53  Lab Results   Component Value Date    PTH 49.7 02/01/2025    PTH 58.4 05/21/2024    CA 9.5 02/01/2025    CA 9.8 08/19/2024    CA 9.2 05/21/2024    CA 9.2 06/13/2023    CA 9.7 02/10/2020    CA 9.7 02/10/2020    CREATSERUM 1.01 02/01/2025    CREATSERUM 1.04 (H) 08/19/2024    CREATSERUM 0.95 05/21/2024    PHOS 4.4 02/01/2025    PHOS 3.3 05/21/2024    VITD 42.5 02/01/2025    VITD 42.3 05/21/2024           Radiology:  Independently visualized.  I reviewed the patient's records from outside facility which revealed: Osteoporosis since 2020    DXA Scans:  Date L2-L4 BMD T-score % change Left Femoral Neck BMD T-score % change    4/3/2024 OS 0.748 -2.7 0.552 -2.7   3/30/2022 [facility] 0.706 -3.1 0.494 -3.2   1/13/2020 [facility] 0.582 -4.2 0.449 -3.6     3/30/2022 [facility] DXA  FINDINGS:   AP SPINE RESULTS:   REGION:       L1-L4   BMD:            0.706 g/cm2   T-SCORE:    -3.1   The change in BMD of L1-L4 since the prior exam is +21.3%.     LEFT HIP RESULTS:   REGION:       Femoral neck   BMD:            0.494 g/cm2   T-SCORE:    -3.2   REGION:       Total hip   BMD:            0.690 g/cm2   T-SCORE:    -2.1      1/13/2020 DXA [facility]  FINDINGS:   AP SPINE RESULTS:   REGION:       L1-L4   BMD:            0.582 g/cm2   T-SCORE:    -4.2     LEFT HIP RESULTS:   REGION:       Femoral neck   BMD:            0.449 g/cm2   T-SCORE:    -3.6   REGION:       Total hip   BMD:            0.648 g/cm2   T-SCORE:    -2.4            ASSESSMENT/PLAN:  Roseanna Simon is a 74 year old female seen in consultation for:    Age-related osteoporosis without current pathological fracture   Discussed pathophysiology of bone loss and clinical significance of DEXA scans with the patient.  The patient has confirmed osteoporosis with low T-scores in the lumbar spine and mean femoral necks.    Explained the patient's risk factors for osteoporosis which include age, postmenopausal female, maternal history of osteoporosis and history of pantoprazole for 15+ years   Recommended workup for secondary causes of osteoporosis, including SPEP, PTH, celiac screen, Alk Phos levels, and vitamin D levels.  Will wait for labs to result and discuss next steps at follow-up visit  Discussed the importance of adopting lifestyle measures, such as adequate calcium and vitamin D intake, exercise, smoking cessation, counseling on fall prevention, and avoidance of heavy alcohol use, to reduce bone loss in postmenopausal women.  Suggested 1200 mg of elemental calcium daily (total diet plus supplement) and 1000 IU of vitamin D daily as general recommendations.  Will update these recommendations once her labs  result  Recommended pharmacologic therapy for postmenopausal women with established osteoporosis or fragility fracture.  Discussed available treatment options for osteoporosis, including bisphosphonates (oral vs. IV), anabolics, Evenity, and Prolia injections, as well as their indications, risks, and benefits, including black box warnings.  Discussed concerns about an increased risk of vertebral fracture after discontinuation of denosumab, the need for indefinite administration of denosumab    Patient was started on Fosamax in 2020 and her recent bone density from April 2024 continues to show osteoporosis, but with improvement when compared to 2020 imaging. We discussed continuing current regimen to complete 5 years of therapy. Will discuss next steps at follow up visit when she completes her bone density.  Repeat bone density April 2025 is recommended as this will help manage if patient will need a drug holiday or alternate therapy     Hypothyroidism   Lab Results   Component Value Date    TSH 2.206 02/01/2025    T4F 1.4 02/22/2024   Continue current dosing: Levothyroxine 75 mcg Monday, Wednesday, Friday, Sunday; Take Levothyroxine 50 mcg all other days since 2/2024   Previously patient endorsed constipation and fatigue. She is to keep me udpated regarding if she wants to switch to brand name       Return in about 3 months (around 5/10/2025).    The above plan was discussed in detail with the patient who verbalized understanding and agreement.         Shantelle Hankins DO  [facility] Endocrinology  2/10/2025     In reviewing this note, please be advised that Dragon Voice Recognition software used to dictate the note may have made errors in recognizing some of the words or phrases.     Note to patient: The 21 Century Cures Act makes medical notes like these available to patients in the interest of transparency. However, be advised this is a medical document. It is intended as peer to peer communication. It is written in  medical language and may contain abbreviations or verbiage that are unfamiliar. It may appear blunt or direct. Medical documents are intended to carry relevant information, facts as evident, and the clinical opinion of the practitioner.       difficulty breathing

## 2025-02-10 NOTE — PATIENT INSTRUCTIONS
Visit Summary  Continue current levothyroxine, refilled today  Restart Fosamax 70 mg weekly x3 months  You will be due for another bone density 4/2025. I will place the order. Please let me know if there is any issue with completing this.     General follow up information:  Please let us know if you require any refills at least 1 week prior to your medication running out. If you do run out of medication, please call our office ASAP to request refills (do not wait until your follow up).  Please call us if you experience any problems with insurance coverage of medication, lab work, or imaging.   Lab results and imaging will typically be reviewed at follow up appointments, or within 3-5 business days of ALL results being in if you do not have an appointment scheduled in the near future. Our office will contact you for any abnormal results requiring more urgent follow up or action.  The on-call pager is for urgent matters only. If you are a type 1 diabetic and run out of insulin after business hours 8AM-4PM, you may call the on-call pager for a refill to a 24 hour pharmacy. If you have adrenal insufficiency and run out of steroids, you may call the on-call pager for a refill to a 24 hours pharmacy. All other refill requests should be requested during business hours.    Return Visit   [x]  Dr. Hankins in 3 months   [] Virtual visit  [] No follow up appointment needed  [] Follow up to be scheduled pending      []  Fasting/8AM labs  [x]  Central scheduling # for ultrasound/nuclear med/CT/MRI/DXA/IR  []  Provide flyer for:  [] ENT   [] Weight Management  [] Infertility/Reproductive Endocrinology  [] Transgender care  []  Directions to 1st floor lab  [] Collect urine collection jug  [] Collect salivary cortisol tubes  []  Dental clearance form  []  Will need authorization for outside records

## 2025-02-19 ENCOUNTER — PATIENT OUTREACH (OUTPATIENT)
Dept: FAMILY MEDICINE CLINIC | Facility: CLINIC | Age: 75
End: 2025-02-19

## 2025-02-19 NOTE — PROGRESS NOTES
Patient is due for her fasting labs, talked to patient's daughter Buffy, (on verbal release) she will have patient give us a call to schedule.

## 2025-02-27 DIAGNOSIS — E78.5 HYPERLIPIDEMIA, UNSPECIFIED HYPERLIPIDEMIA TYPE: ICD-10-CM

## 2025-02-28 RX ORDER — ROSUVASTATIN CALCIUM 5 MG/1
5 TABLET, COATED ORAL NIGHTLY
Qty: 90 TABLET | Refills: 0 | Status: SHIPPED | OUTPATIENT
Start: 2025-02-28

## 2025-02-28 NOTE — TELEPHONE ENCOUNTER
LOV: 12/9/24 for COVID      ROSUVASTATIN 5 MG Oral Tab  Take 1 tablet by mouth nightly. Dispense: 90 tablet, Refills: 0 ordered        12/02/2024       Cholesterol Medication Protocol [id number]/27/2025 09:30 PM   Protocol Details ALT < 80    ALT resulted within past year    Lipid panel within past 12 months    In person appointment or virtual visit in the past 12 mos or appointment in next 3 mos    Medication is active on med list     Future Appointments   Date Time Provider Department Center   5/19/2025 11:00 AM Shantelle Hankins DO [id number]

## 2025-03-10 ENCOUNTER — PATIENT MESSAGE (OUTPATIENT)
Facility: CLINIC | Age: 75
End: 2025-03-10

## 2025-03-14 NOTE — TELEPHONE ENCOUNTER
Please let her know I reviewed this and the ingredients are mostly vitamins. There is no clear evidence that this will be helpful but at the same time there isnt data that this will be harmful. I will leave it up to her regarding if she would like to start this.   She should be generally careful since some supplements have active thyroid hormone in it, which I would recommend avoiding.

## 2025-04-15 RX ORDER — PROPRANOLOL HYDROCHLORIDE 40 MG/1
40 TABLET ORAL 2 TIMES DAILY
Qty: 180 TABLET | Refills: 0 | Status: SHIPPED | OUTPATIENT
Start: 2025-04-15

## 2025-04-15 NOTE — TELEPHONE ENCOUNTER
Propranolol last refilled 1/8/25  No future appointment in office  LV with Ana 12/9/24  Last CMP 2/1/25  Due for physical in June      Hypertension Medications Protocol Nwmeci10/15/2025 07:35 AM   Protocol Details CMP or BMP in past 12 months    Last BP reading less than 140/90    In person appointment or virtual visit in the past 12 mos or appointment in next 3 mos    EGFRCR or GFRNAA > 50    Medication is active on med list

## 2025-05-02 ENCOUNTER — PATIENT MESSAGE (OUTPATIENT)
Facility: CLINIC | Age: 75
End: 2025-05-02

## 2025-05-05 NOTE — TELEPHONE ENCOUNTER
RN spoke with provider and had her add on two my Dx codes for billing- Z79.83, Z78.0    Phoned central scheduling and was transferred to [facility] at number: [phone]- arcq voice mail explaining situation and asked for return call.     RN phoned patient and gave her the number above- patient to follow up with them as well.     Reviewed that if this does not work for coverage- patient to cancel and then follow up with our office.     Patient verbalized understanding of all.

## 2025-05-05 NOTE — TELEPHONE ENCOUNTER
RN received call back from [employer], they state that they can see the order but they can't put it through insurance.     [employer] sent for review.

## 2025-05-05 NOTE — TELEPHONE ENCOUNTER
25-Patient called into clinic today.  Verified name and .  She states Dr. DELGADO asked for her to get a DEXA this year and she just had one  last year and since they are only covered every 2 years Dr DELGADO was supposed to get it approved.  She scheduled the DEXA for tomorrow and now is getting told it may not be approved.  Please give patient a call.

## 2025-05-08 NOTE — TELEPHONE ENCOUNTER
Received call from patient regarding her Dexa scan, states she was told to call our office to see if Dexa Scan is covered.     RN reviewed that since we know that Medicare covers Dexa scans every 2 years, we tried to use billing codes that could help with early coverage, however we do not bill and can't see if this is going through insurance.     RN phoned Central Schedule to investigate. Left a message for call back.    Phoned patient and reviewed everything to verbalized understanding.     Phone Central Scheduling again and spoke with a representative regarding Dexa scan- states normally they would get a warning saying \"too soon\" to have done if it is within 2 years. However she is not getting any warning but can't confirm if this will be covered.     RN attempted to Phone medicare on patient line- was provided provider line below. Phoned this number, states Medicare is closed.     Provider line: [phone]    Z79.83, Z78.0, M81.0, E03.0  CPT code 85361    Mychart sent reviewing all.

## 2025-05-11 NOTE — TELEPHONE ENCOUNTER
Thank you for letting me know! Patient can complete 5 years of fosamax and repeat BTM (CTX and BSAP) 6 months after discontinuation with plan to follow up in clinic at that time. I did add new codes to see if this will get covered- if patient reschedules for dexa and wants to follow up sooner, please have her let us know.

## 2025-05-13 DIAGNOSIS — E03.9 ACQUIRED HYPOTHYROIDISM: ICD-10-CM

## 2025-05-14 RX ORDER — LEVOTHYROXINE SODIUM 50 UG/1
TABLET ORAL
Qty: 115 TABLET | Refills: 1 | Status: SHIPPED | OUTPATIENT
Start: 2025-05-14

## 2025-05-14 NOTE — TELEPHONE ENCOUNTER
Endocrine refill protocol for medications for hypothyroidism and hyperthyroidism    Protocol Criteria:  PASSED Reason: N/A    If all below requirements are met, send a 90-day supply with 1 refill per provider protocol.    Verify appointment with Endocrinology completed in the last 12 months or scheduled in the next 6 months.    Normal TSH result in the past 12 months   Review recent telephone encounters and mychart communications with patient to ensure a dose change has not occurred since last office visit that was not updated in the medication history list     Last completed office visit:2/10/2025 Shantelle Hankins DO   Last completed telemed visit: Visit date not found  Next scheduled Follow up:   Future Appointments   Date Time Provider Department Center   5/19/2025 11:00 AM Shantelle Hankins DO [id number] [place]      Last TSH result:   TSH   Date Value Ref Range Status   02/01/2025 2.206 0.550 - 4.780 uIU/mL Final

## 2025-05-19 ENCOUNTER — TELEPHONE (OUTPATIENT)
Facility: CLINIC | Age: 75
End: 2025-05-19

## 2025-05-19 NOTE — TELEPHONE ENCOUNTER
MA took patient off the schedule for today needs to be reschedule after Dexa Scan is completed. Roseanna stated she had to cancel her dexa scan numerous times and needs help with the codes because medicare will not cover dexa scan yearly. Patient is awaiting nurse call to help with the process.

## 2025-05-22 ENCOUNTER — PATIENT MESSAGE (OUTPATIENT)
Facility: CLINIC | Age: 75
End: 2025-05-22

## 2025-05-22 DIAGNOSIS — M81.0 AGE-RELATED OSTEOPOROSIS WITHOUT CURRENT PATHOLOGICAL FRACTURE: Primary | ICD-10-CM

## 2025-05-27 NOTE — TELEPHONE ENCOUNTER
Dr. Hankins, I’m a little confused about what the plan is for me. at our February appt. You discussed getting off the Fosamax and trying Prolia but you were going to set up a bone scan and even though it was only one year since last one you were going to make sure that it would be covered. When I set an appt to get the scan someone called and said I should check to see if I was going to be covered. I did call and no results and that’s when Steve called and told me to give them some codes that should have been on the order. I tried that and the people at Medicare said I can’t do that it was the provider’s responsibility to put that in and you would find out if it was covered. See next note.     I’m not sure where the disconnect is but I tried to schedule again and was still not told if I was covered so I cancelled. I thought I would keep my appt with you and discuss the issues but was called as I was on the way to my appt. And told not to come and just cancel. Then I get a message from Steve to continue Fosamax again and reschedule appt with you in Nov. That’s another 6 months on the medication you said I should try to change. Why can’t this Dexa scan be scheduled? See next note.       In the Medicare info it says you can get one earlier than two years if you are being monitored to see if your osteoporosis drug is working, but your provider has to put the order in for it. I’m truly worried about using all this time taking more Fosamax that I may need to use later in life. Please let me know what you feel is the best course of action. I have been off the Fosamax for about two to three weeks now and have no refills . I’m very sorry for the long message, Thank You, Roseanna Simon

## 2025-05-28 NOTE — TELEPHONE ENCOUNTER
I have reviewed her message. Patient was started on Fosamax in 2020 and her recent bone density from April 2024 continues to show osteoporosis, but with improvement when compared to 2020 imaging. We discussed continuing current regimen to complete 5 years of therapy (April 2025).   I wanted her to repeat bone density April 2025as this will help manage if patient will need a drug holiday or alternate therapy --> is there any letter or appeal that we can send from our end?  If this is not possible, then she should STOP fosomax (as it would have been 5 years around now) and remain off of any prescription therapy with plan for repeat labs (BSAP, CTX, renal function panel, vitamin d) 6 months off of therapy (around 11/2025). She should continue vitamin d, calcium, weight bearing exercises. I will see her at that time.    Please let me know if she has any questions or concerns.

## 2025-05-29 DIAGNOSIS — E78.5 HYPERLIPIDEMIA, UNSPECIFIED HYPERLIPIDEMIA TYPE: ICD-10-CM

## 2025-05-29 RX ORDER — ROSUVASTATIN CALCIUM 5 MG/1
5 TABLET, COATED ORAL NIGHTLY
Qty: 90 TABLET | Refills: 0 | Status: SHIPPED | OUTPATIENT
Start: 2025-05-29

## 2025-05-29 NOTE — TELEPHONE ENCOUNTER
LOV: 12/9/24 for COVID    ROSUVASTATIN 5 MG Oral Tab  Take 1 tablet by mouth nightly. Dispense: 90 tablet, Refills: 0 ordered        02/28/2025          Cholesterol Medication Protocol [id number]/29/2025 09:26 AM   Protocol Details ALT < 80    ALT resulted within past year    Lipid panel within past 12 months    In person appointment or virtual visit in the past 12 mos or appointment in next 3 mos    Medication is active on med list     No future appointments.

## 2025-05-30 NOTE — TELEPHONE ENCOUNTER
Received call from patient and reviewed below note.    Patient spoke with Medicare and states that she was told as long as we place the proper codes with Dexa order, it should go through for early Scan. RN reviewed that additional codes were added and the new order no longer triggered a \"too soon to schedule\" flag, however we can't guarantee coverage through Medicare. Patient verbalized understanding.    Plan moving forward- patient is to STOP fosamax as he has completed a full 5 years. Then she will repeat labs in 6 months around 10 days prior to visit on 12/8 with provider.

## 2025-06-02 RX ORDER — BUSPIRONE HYDROCHLORIDE 30 MG/1
30 TABLET ORAL DAILY
Qty: 30 TABLET | Refills: 0 | Status: SHIPPED | OUTPATIENT
Start: 2025-06-02 | End: 2025-07-02

## 2025-06-02 NOTE — TELEPHONE ENCOUNTER
Last office visit 6/20/24  Last refilled on 7/2/24 for # 90 with 1 refills  Future Appointments   Date Time Provider Department Center   12/8/2025  1:30 PM Shantelle Hankins DO [id number]        Thank you.

## 2025-06-03 NOTE — TELEPHONE ENCOUNTER
Spoke to patient, she says she does not want to schedule her wellness visit at this time, she will call back to schedule.

## 2025-06-06 ENCOUNTER — PATIENT OUTREACH (OUTPATIENT)
Dept: FAMILY MEDICINE CLINIC | Facility: CLINIC | Age: 75
End: 2025-06-06

## 2025-07-10 ENCOUNTER — HOSPITAL ENCOUNTER (OUTPATIENT)
Dept: GENERAL RADIOLOGY | Age: 75
Discharge: HOME | End: 2025-07-10
Attending: FAMILY MEDICINE
Payer: MEDICARE

## 2025-07-10 ENCOUNTER — OFFICE VISIT (OUTPATIENT)
Dept: FAMILY MEDICINE CLINIC | Facility: CLINIC | Age: 75
End: 2025-07-10
Payer: MEDICARE

## 2025-07-10 VITALS
HEIGHT: 61 IN | DIASTOLIC BLOOD PRESSURE: 78 MMHG | WEIGHT: 152.19 LBS | OXYGEN SATURATION: 99 % | TEMPERATURE: 97 F | RESPIRATION RATE: 18 BRPM | BODY MASS INDEX: 28.73 KG/M2 | HEART RATE: 61 BPM | SYSTOLIC BLOOD PRESSURE: 110 MMHG

## 2025-07-10 DIAGNOSIS — M81.0 AGE-RELATED OSTEOPOROSIS WITHOUT CURRENT PATHOLOGICAL FRACTURE: ICD-10-CM

## 2025-07-10 DIAGNOSIS — G43.709 CHRONIC MIGRAINE WITHOUT AURA WITHOUT STATUS MIGRAINOSUS, NOT INTRACTABLE: ICD-10-CM

## 2025-07-10 DIAGNOSIS — E78.5 HYPERLIPIDEMIA, UNSPECIFIED HYPERLIPIDEMIA TYPE: ICD-10-CM

## 2025-07-10 DIAGNOSIS — Z00.00 MEDICARE ANNUAL WELLNESS VISIT, SUBSEQUENT: Primary | ICD-10-CM

## 2025-07-10 DIAGNOSIS — M79.672 LEFT FOOT PAIN: ICD-10-CM

## 2025-07-10 DIAGNOSIS — K21.9 GASTROESOPHAGEAL REFLUX DISEASE WITHOUT ESOPHAGITIS: ICD-10-CM

## 2025-07-10 DIAGNOSIS — F41.9 ANXIETY: ICD-10-CM

## 2025-07-10 DIAGNOSIS — M18.0 PRIMARY OSTEOARTHRITIS OF BOTH FIRST CARPOMETACARPAL JOINTS: ICD-10-CM

## 2025-07-10 PROCEDURE — 73630 X-RAY EXAM OF FOOT: CPT | Performed by: FAMILY MEDICINE

## 2025-07-10 PROCEDURE — G0439 PPPS, SUBSEQ VISIT: HCPCS | Performed by: FAMILY MEDICINE

## 2025-07-10 RX ORDER — PROPRANOLOL HYDROCHLORIDE 40 MG/1
40 TABLET ORAL 2 TIMES DAILY
Qty: 180 TABLET | Refills: 3 | Status: SHIPPED | OUTPATIENT
Start: 2025-07-10

## 2025-07-10 RX ORDER — BUSPIRONE HYDROCHLORIDE 30 MG/1
30 TABLET ORAL DAILY
Qty: 90 TABLET | Refills: 3 | Status: SHIPPED | OUTPATIENT
Start: 2025-07-10

## 2025-07-10 RX ORDER — THYROID 120 MG/1
TABLET ORAL
COMMUNITY
Start: 2023-12-22

## 2025-07-10 RX ORDER — TOPIRAMATE 25 MG/1
25 TABLET, FILM COATED ORAL DAILY
Qty: 90 TABLET | Refills: 0 | Status: SHIPPED | OUTPATIENT
Start: 2025-07-10 | End: 2025-10-08

## 2025-07-10 RX ORDER — RIZATRIPTAN BENZOATE 10 MG/1
10 TABLET ORAL AS NEEDED
Qty: 12 TABLET | Refills: 3 | Status: SHIPPED | OUTPATIENT
Start: 2025-07-10

## 2025-07-10 RX ORDER — ROSUVASTATIN CALCIUM 5 MG/1
5 TABLET, COATED ORAL NIGHTLY
Qty: 90 TABLET | Refills: 3 | Status: SHIPPED | OUTPATIENT
Start: 2025-07-10

## 2025-07-15 ENCOUNTER — RESULTS FOLLOW-UP (OUTPATIENT)
Dept: FAMILY MEDICINE CLINIC | Facility: CLINIC | Age: 75
End: 2025-07-15

## 2025-07-15 DIAGNOSIS — M79.672 LEFT FOOT PAIN: Primary | ICD-10-CM

## 2025-07-16 NOTE — TELEPHONE ENCOUNTER
----- Message from Ely Kraus sent at 7/15/2025  6:16 PM CDT -----  Results reviewed. Please inform patient x-ray shows no acute fracture but mild degenerative changes.  There is no foreign body.  She is she is feeling a lump and pain in the feet I would recommend   seeing podiatry.  Please put referral for Dr. Ion Barkley.   ----- Message -----  From: Clarita Samaniego In  Sent: 7/10/2025   3:52 PM CDT  To: Ely Kraus MD

## 2025-08-09 ENCOUNTER — TELEPHONE (OUTPATIENT)
Dept: FAMILY MEDICINE CLINIC | Facility: CLINIC | Age: 75
End: 2025-08-09